# Patient Record
Sex: MALE | Race: WHITE | NOT HISPANIC OR LATINO | Employment: OTHER | URBAN - METROPOLITAN AREA
[De-identification: names, ages, dates, MRNs, and addresses within clinical notes are randomized per-mention and may not be internally consistent; named-entity substitution may affect disease eponyms.]

---

## 2023-08-25 PROBLEM — M54.16 LUMBAR RADICULOPATHY: Status: ACTIVE | Noted: 2023-08-25

## 2023-08-25 PROBLEM — E88.819 INSULIN RESISTANCE: Status: ACTIVE | Noted: 2023-08-25

## 2023-08-25 PROBLEM — Z15.89 JAK-2 GENE MUTATION: Status: ACTIVE | Noted: 2023-08-25

## 2023-08-25 PROBLEM — E55.9 VITAMIN D DEFICIENCY: Status: ACTIVE | Noted: 2023-08-25

## 2023-08-25 PROBLEM — J44.9 COPD MIXED TYPE (MULTI): Status: ACTIVE | Noted: 2023-08-25

## 2023-08-25 PROBLEM — M43.16 SPONDYLOLISTHESIS AT L4-L5 LEVEL: Status: ACTIVE | Noted: 2023-08-25

## 2023-08-25 PROBLEM — Z95.5 HISTORY OF CORONARY ARTERY STENT PLACEMENT: Status: ACTIVE | Noted: 2023-08-25

## 2023-08-25 PROBLEM — Z95.1 S/P CORONARY ARTERY BYPASS GRAFT X 1: Status: ACTIVE | Noted: 2023-08-25

## 2023-08-25 PROBLEM — M48.062 NEUROGENIC CLAUDICATION DUE TO LUMBAR SPINAL STENOSIS: Status: ACTIVE | Noted: 2023-08-25

## 2023-08-25 PROBLEM — I10 BENIGN HYPERTENSION: Status: ACTIVE | Noted: 2023-08-25

## 2023-08-25 PROBLEM — G95.9 CERVICAL MYELOPATHY (MULTI): Status: ACTIVE | Noted: 2023-08-25

## 2023-08-25 PROBLEM — I25.118 CORONARY ARTERY DISEASE OF NATIVE ARTERY OF NATIVE HEART WITH STABLE ANGINA PECTORIS (CMS-HCC): Status: ACTIVE | Noted: 2023-08-25

## 2023-08-25 PROBLEM — M47.14 THORACIC MYELOPATHY: Status: ACTIVE | Noted: 2023-08-25

## 2023-08-25 PROBLEM — E78.5 HYPERLIPIDEMIA: Status: ACTIVE | Noted: 2023-08-25

## 2023-08-25 PROBLEM — D72.10 EOSINOPHILIA, UNSPECIFIED: Status: ACTIVE | Noted: 2023-08-25

## 2023-08-25 PROBLEM — I25.10 ASHD (ARTERIOSCLEROTIC HEART DISEASE): Status: ACTIVE | Noted: 2023-08-25

## 2023-08-25 PROBLEM — Z98.62 STATUS POST ANGIOPLASTY: Status: ACTIVE | Noted: 2023-08-25

## 2023-08-25 RX ORDER — CARVEDILOL 6.25 MG/1
1 TABLET ORAL 2 TIMES DAILY
COMMUNITY
Start: 2021-06-03

## 2023-08-25 RX ORDER — ATORVASTATIN CALCIUM 40 MG/1
TABLET, FILM COATED ORAL
COMMUNITY
Start: 2022-06-28 | End: 2023-10-30

## 2023-08-25 RX ORDER — ACETAMINOPHEN 500 MG
2 TABLET ORAL EVERY 6 HOURS PRN
COMMUNITY
Start: 2022-10-02 | End: 2023-12-18 | Stop reason: ALTCHOICE

## 2023-08-25 RX ORDER — CETIRIZINE HYDROCHLORIDE 10 MG/1
1 TABLET ORAL DAILY
COMMUNITY
End: 2023-12-18 | Stop reason: ALTCHOICE

## 2023-08-25 RX ORDER — PRASUGREL 10 MG/1
1 TABLET, FILM COATED ORAL DAILY
COMMUNITY
Start: 2022-06-28

## 2023-08-25 RX ORDER — ATORVASTATIN CALCIUM 20 MG/1
10 TABLET, FILM COATED ORAL DAILY
COMMUNITY
Start: 2023-04-12 | End: 2023-12-18 | Stop reason: ALTCHOICE

## 2023-08-25 RX ORDER — ERGOCALCIFEROL 1.25 MG/1
CAPSULE ORAL
COMMUNITY
Start: 2022-06-28 | End: 2023-12-18 | Stop reason: ALTCHOICE

## 2023-08-25 RX ORDER — OMEPRAZOLE 20 MG/1
1 TABLET, DELAYED RELEASE ORAL AS NEEDED
COMMUNITY
Start: 2022-10-02 | End: 2023-12-18 | Stop reason: WASHOUT

## 2023-08-25 RX ORDER — NITROGLYCERIN 0.4 MG/1
1 TABLET SUBLINGUAL
COMMUNITY
Start: 2022-06-28

## 2023-08-25 RX ORDER — NAPROXEN SODIUM 220 MG/1
1 TABLET, FILM COATED ORAL DAILY
COMMUNITY
End: 2023-12-18 | Stop reason: ALTCHOICE

## 2023-08-25 RX ORDER — AMLODIPINE BESYLATE 5 MG/1
1 TABLET ORAL DAILY
COMMUNITY
Start: 2022-02-01 | End: 2024-03-21 | Stop reason: SDUPTHER

## 2023-09-30 ENCOUNTER — ANCILLARY PROCEDURE (OUTPATIENT)
Dept: RADIOLOGY | Facility: CLINIC | Age: 68
End: 2023-09-30
Payer: MEDICARE

## 2023-09-30 DIAGNOSIS — R05.9 COUGH: ICD-10-CM

## 2023-09-30 DIAGNOSIS — R06.2 WHEEZING: ICD-10-CM

## 2023-09-30 PROCEDURE — 71046 X-RAY EXAM CHEST 2 VIEWS: CPT | Performed by: RADIOLOGY

## 2023-09-30 PROCEDURE — 71046 X-RAY EXAM CHEST 2 VIEWS: CPT

## 2023-10-25 ENCOUNTER — OFFICE VISIT (OUTPATIENT)
Dept: CARDIOLOGY | Facility: CLINIC | Age: 68
End: 2023-10-25
Payer: MEDICARE

## 2023-10-25 VITALS
SYSTOLIC BLOOD PRESSURE: 128 MMHG | DIASTOLIC BLOOD PRESSURE: 80 MMHG | WEIGHT: 282 LBS | BODY MASS INDEX: 38.25 KG/M2 | HEART RATE: 85 BPM | OXYGEN SATURATION: 95 %

## 2023-10-25 DIAGNOSIS — E78.2 MIXED HYPERLIPIDEMIA: ICD-10-CM

## 2023-10-25 DIAGNOSIS — I10 BENIGN HYPERTENSION: ICD-10-CM

## 2023-10-25 DIAGNOSIS — E78.2 MIXED HYPERLIPIDEMIA: Primary | ICD-10-CM

## 2023-10-25 DIAGNOSIS — I25.10 ASHD (ARTERIOSCLEROTIC HEART DISEASE): Primary | ICD-10-CM

## 2023-10-25 PROCEDURE — 1036F TOBACCO NON-USER: CPT | Performed by: INTERNAL MEDICINE

## 2023-10-25 PROCEDURE — 3079F DIAST BP 80-89 MM HG: CPT | Performed by: INTERNAL MEDICINE

## 2023-10-25 PROCEDURE — 1159F MED LIST DOCD IN RCRD: CPT | Performed by: INTERNAL MEDICINE

## 2023-10-25 PROCEDURE — 3074F SYST BP LT 130 MM HG: CPT | Performed by: INTERNAL MEDICINE

## 2023-10-25 PROCEDURE — 99213 OFFICE O/P EST LOW 20 MIN: CPT | Performed by: INTERNAL MEDICINE

## 2023-10-25 PROCEDURE — 1126F AMNT PAIN NOTED NONE PRSNT: CPT | Performed by: INTERNAL MEDICINE

## 2023-10-25 ASSESSMENT — ENCOUNTER SYMPTOMS
SHORTNESS OF BREATH: 1
NEUROLOGICAL NEGATIVE: 1
GASTROINTESTINAL NEGATIVE: 1
EYES NEGATIVE: 1
CONSTITUTIONAL NEGATIVE: 1
CARDIOVASCULAR NEGATIVE: 1
MUSCULOSKELETAL NEGATIVE: 1
COUGH: 1

## 2023-10-25 ASSESSMENT — PAIN SCALES - GENERAL: PAINLEVEL: 0-NO PAIN

## 2023-10-25 NOTE — PROGRESS NOTES
Subjective   Tuan Wood is a 67 y.o. male.    Chief Complaint:  6 month f/u     HPI  Cardiac wise the patient relatively stable with no anginal type chest pains.  But has had shortness of breath and cough.  Diagnosed with pneumonia and put on a course of antibiotic therapy as well as steroids.  Cough has continued although improved from previous.  Review of Systems   Constitutional: Negative.   Eyes: Negative.    Cardiovascular: Negative.    Respiratory:  Positive for cough and shortness of breath.    Musculoskeletal: Negative.    Gastrointestinal: Negative.    Genitourinary: Negative.    Neurological: Negative.        Objective   Constitutional:       Appearance: Not in distress.   Eyes:      Conjunctiva/sclera: Conjunctivae normal.   Neck:      Vascular: JVD normal.   Pulmonary:      Breath sounds: Normal breath sounds. No wheezing. No rhonchi. No rales.   Cardiovascular:      Normal rate. Regular rhythm.      Murmurs: There is no murmur.      No gallop.  No click. No rub.   Abdominal:      Palpations: Abdomen is soft.   Neurological:      General: No focal deficit present.      Mental Status: Alert.         Lab Review:   No visits with results within 2 Month(s) from this visit.   Latest known visit with results is:   [facility] Encounter on 07/31/2023   Component Date Value    WBC 07/31/2023 8.3     RBC 07/31/2023 6.90 (H)     Hemoglobin 07/31/2023 15.1     Hematocrit 07/31/2023 50.7     MCV 07/31/2023 73 (L)     MCHC 07/31/2023 29.8 (L)     Platelets 07/31/2023 347     RDW 07/31/2023 19.7 (H)     Neutrophils % 07/31/2023 74.1     Immature Granulocytes %,* 07/31/2023 0.2     Lymphocytes % 07/31/2023 15.8     Monocytes % 07/31/2023 5.3     Eosinophils % 07/31/2023 3.9     Basophils % 07/31/2023 0.7     Neutrophils Absolute 07/31/2023 6.15     Lymphocytes Absolute 07/31/2023 1.31     Monocytes Absolute 07/31/2023 0.44     Eosinophils Absolute 07/31/2023 0.32     Basophils Absolute 07/31/2023 0.06     RBC  Morphology 07/31/2023 See Below     Hypochromia 07/31/2023 Mild     Ovalocytes 07/31/2023 Few        Assessment/Plan   The primary encounter diagnosis was ASHD (arteriosclerotic heart disease). Diagnoses of Benign hypertension and Mixed hyperlipidemia were also pertinent to this visit.    Hyperlipidemia  Lipid panel 4/23:  Tchol 158  HDL 30 , now back on the atorvastatin, check panel on  return.    Benign hypertension  BP reasonable, will follow on a routine basis.    ASHD (arteriosclerotic heart disease)  No anginal type chest pains.  Recent pneumonia and treated with antibiotics, slowly better, still coughing.  Staying on prasugrel indefinitely.  EF normal on echo done in March.

## 2023-10-25 NOTE — ASSESSMENT & PLAN NOTE
No anginal type chest pains.  Recent pneumonia and treated with antibiotics, slowly better, still coughing.  Staying on prasugrel indefinitely.  EF normal on echo done in March.

## 2023-10-26 ENCOUNTER — TELEPHONE (OUTPATIENT)
Dept: CARDIOLOGY | Facility: CLINIC | Age: 68
End: 2023-10-26
Payer: MEDICARE

## 2023-10-26 NOTE — TELEPHONE ENCOUNTER
Received fax from [facility] for Lipitor refill 40mg tablets. Not sure if that is correct as last office visit shows patient taking 20mg daily, will send to Dr. Davalos for clarification

## 2023-10-30 ENCOUNTER — OFFICE VISIT (OUTPATIENT)
Dept: HEMATOLOGY/ONCOLOGY | Facility: CLINIC | Age: 68
End: 2023-10-30
Payer: MEDICARE

## 2023-10-30 ENCOUNTER — APPOINTMENT (OUTPATIENT)
Dept: LAB | Facility: HOSPITAL | Age: 68
End: 2023-10-30
Payer: MEDICARE

## 2023-10-30 VITALS
SYSTOLIC BLOOD PRESSURE: 135 MMHG | DIASTOLIC BLOOD PRESSURE: 78 MMHG | TEMPERATURE: 97.9 F | OXYGEN SATURATION: 96 % | WEIGHT: 284.17 LBS | BODY MASS INDEX: 38.49 KG/M2 | HEIGHT: 72 IN | HEART RATE: 70 BPM | RESPIRATION RATE: 18 BRPM

## 2023-10-30 DIAGNOSIS — B27.90 EPSTEIN BARR VIRUS INFECTION: ICD-10-CM

## 2023-10-30 LAB
ALBUMIN SERPL BCP-MCNC: 4.3 G/DL (ref 3.4–5)
ALP SERPL-CCNC: 56 U/L (ref 33–136)
ALT SERPL W P-5'-P-CCNC: 37 U/L (ref 10–52)
ANION GAP SERPL CALC-SCNC: 15 MMOL/L (ref 10–20)
AST SERPL W P-5'-P-CCNC: 23 U/L (ref 9–39)
BASOPHILS # BLD AUTO: 0.1 X10*3/UL (ref 0–0.1)
BASOPHILS NFR BLD AUTO: 1.3 %
BILIRUB SERPL-MCNC: 0.8 MG/DL (ref 0–1.2)
BUN SERPL-MCNC: 7 MG/DL (ref 6–23)
CALCIUM SERPL-MCNC: 10.1 MG/DL (ref 8.6–10.3)
CHLORIDE SERPL-SCNC: 105 MMOL/L (ref 98–107)
CO2 SERPL-SCNC: 22 MMOL/L (ref 21–32)
CREAT SERPL-MCNC: 0.97 MG/DL (ref 0.5–1.3)
EOSINOPHIL # BLD AUTO: 0.41 X10*3/UL (ref 0–0.7)
EOSINOPHIL NFR BLD AUTO: 5.2 %
ERYTHROCYTE [DISTWIDTH] IN BLOOD BY AUTOMATED COUNT: 20.1 % (ref 11.5–14.5)
GFR SERPL CREATININE-BSD FRML MDRD: 87 ML/MIN/1.73M*2
GLUCOSE SERPL-MCNC: 101 MG/DL (ref 74–99)
HCT VFR BLD AUTO: 51.9 % (ref 41–52)
HGB BLD-MCNC: 15.3 G/DL (ref 13.5–17.5)
HYPOCHROMIA BLD QL SMEAR: NORMAL
IMM GRANULOCYTES # BLD AUTO: 0.01 X10*3/UL (ref 0–0.7)
IMM GRANULOCYTES NFR BLD AUTO: 0.1 % (ref 0–0.9)
LDH SERPL L TO P-CCNC: 199 U/L (ref 84–246)
LYMPHOCYTES # BLD AUTO: 1.15 X10*3/UL (ref 1.2–4.8)
LYMPHOCYTES NFR BLD AUTO: 14.6 %
MCH RBC QN AUTO: 21.7 PG (ref 26–34)
MCHC RBC AUTO-ENTMCNC: 29.5 G/DL (ref 32–36)
MCV RBC AUTO: 74 FL (ref 80–100)
MONOCYTES # BLD AUTO: 0.49 X10*3/UL (ref 0.1–1)
MONOCYTES NFR BLD AUTO: 6.2 %
NEUTROPHILS # BLD AUTO: 5.71 X10*3/UL (ref 1.2–7.7)
NEUTROPHILS NFR BLD AUTO: 72.6 %
NRBC BLD-RTO: ABNORMAL /100{WBCS}
OVALOCYTES BLD QL SMEAR: NORMAL
PLATELET # BLD AUTO: 410 X10*3/UL (ref 150–450)
PMV BLD AUTO: 9.2 FL (ref 7.5–11.5)
POTASSIUM SERPL-SCNC: 4.4 MMOL/L (ref 3.5–5.3)
PROT SERPL-MCNC: 7.3 G/DL (ref 6.4–8.2)
RBC # BLD AUTO: 7.05 X10*6/UL (ref 4.5–5.9)
RBC MORPH BLD: NORMAL
SODIUM SERPL-SCNC: 138 MMOL/L (ref 136–145)
STOMATOCYTES BLD QL SMEAR: NORMAL
WBC # BLD AUTO: 7.9 X10*3/UL (ref 4.4–11.3)

## 2023-10-30 PROCEDURE — 36415 COLL VENOUS BLD VENIPUNCTURE: CPT | Performed by: NURSE PRACTITIONER

## 2023-10-30 PROCEDURE — 83615 LACTATE (LD) (LDH) ENZYME: CPT | Performed by: NURSE PRACTITIONER

## 2023-10-30 PROCEDURE — 99214 OFFICE O/P EST MOD 30 MIN: CPT | Mod: PO | Performed by: NURSE PRACTITIONER

## 2023-10-30 PROCEDURE — 3075F SYST BP GE 130 - 139MM HG: CPT | Performed by: NURSE PRACTITIONER

## 2023-10-30 PROCEDURE — 1126F AMNT PAIN NOTED NONE PRSNT: CPT | Performed by: NURSE PRACTITIONER

## 2023-10-30 PROCEDURE — 99214 OFFICE O/P EST MOD 30 MIN: CPT | Performed by: NURSE PRACTITIONER

## 2023-10-30 PROCEDURE — 3078F DIAST BP <80 MM HG: CPT | Performed by: NURSE PRACTITIONER

## 2023-10-30 PROCEDURE — 1036F TOBACCO NON-USER: CPT | Performed by: NURSE PRACTITIONER

## 2023-10-30 PROCEDURE — 85025 COMPLETE CBC W/AUTO DIFF WBC: CPT | Performed by: NURSE PRACTITIONER

## 2023-10-30 PROCEDURE — 1159F MED LIST DOCD IN RCRD: CPT | Performed by: NURSE PRACTITIONER

## 2023-10-30 PROCEDURE — 80053 COMPREHEN METABOLIC PANEL: CPT | Performed by: NURSE PRACTITIONER

## 2023-10-30 RX ORDER — ATORVASTATIN CALCIUM 40 MG/1
40 TABLET, FILM COATED ORAL DAILY
Qty: 90 TABLET | Refills: 0 | Status: SHIPPED | OUTPATIENT
Start: 2023-10-30 | End: 2024-03-25

## 2023-10-30 ASSESSMENT — ENCOUNTER SYMPTOMS
BLOOD IN STOOL: 0
CHILLS: 1
DEPRESSION: 0
ARTHRALGIAS: 1
DIARRHEA: 0
SCLERAL ICTERUS: 0
SHORTNESS OF BREATH: 1
DIAPHORESIS: 1
UNEXPECTED WEIGHT CHANGE: 0
FATIGUE: 1
PALPITATIONS: 0
BACK PAIN: 0
OCCASIONAL FEELINGS OF UNSTEADINESS: 0
COUGH: 1
CONSTIPATION: 0
DIZZINESS: 1
HEADACHES: 1
TROUBLE SWALLOWING: 0
LOSS OF SENSATION IN FEET: 0
SORE THROAT: 0
MYALGIAS: 1
NAUSEA: 0
ABDOMINAL PAIN: 0
FLANK PAIN: 0
FEVER: 1
ADENOPATHY: 0
HEMOPTYSIS: 0

## 2023-10-30 ASSESSMENT — COLUMBIA-SUICIDE SEVERITY RATING SCALE - C-SSRS
6. HAVE YOU EVER DONE ANYTHING, STARTED TO DO ANYTHING, OR PREPARED TO DO ANYTHING TO END YOUR LIFE?: NO
1. IN THE PAST MONTH, HAVE YOU WISHED YOU WERE DEAD OR WISHED YOU COULD GO TO SLEEP AND NOT WAKE UP?: NO
2. HAVE YOU ACTUALLY HAD ANY THOUGHTS OF KILLING YOURSELF?: NO

## 2023-10-30 ASSESSMENT — PATIENT HEALTH QUESTIONNAIRE - PHQ9
SUM OF ALL RESPONSES TO PHQ9 QUESTIONS 1 AND 2: 0
1. LITTLE INTEREST OR PLEASURE IN DOING THINGS: NOT AT ALL
2. FEELING DOWN, DEPRESSED OR HOPELESS: NOT AT ALL

## 2023-10-30 ASSESSMENT — PAIN SCALES - GENERAL: PAINLEVEL: 0-NO PAIN

## 2023-10-30 NOTE — PROGRESS NOTES
"Patient ID: Tuan Wood is a 67 y.o. male.    Primary Care Provider: Pa Riley DO  Visit Type: Follow Up      Subjective    HPI  66 yo with normal blood work prior to back surgery in September 2022 initially presented for evaluation of labs 12/21/22 WBC 12, hgb 15.7 and plt 513, previously 10/1/22 WBC 16.3, hgb 14 and plt 455.  He reviews that in early December he has felt terrible with fevers, night sweats, bone aching, myalgias and muscle spasms and productive cough.   At previous visit he reported symptoms  resolved other than ongoing cough which is occasionally productive and has continued now with mucopurulent sputum and cough that keeps him up at night, but today reports the cough is worse than ever and symptoms are recurring.  He again has dizziness, tinnitis, blurry vision and bitemporal headaches.    He denies abdominal pain or diarrhea.  He has known pulmonary nodules which are unchanged since 2019 by CT done 12/22/22.  One LLL nodule 0.7cm unchanged from 5/19/22.   CT 8/15/23 showed nodular densities right figgural lymph node.   We had previously offered biopsy, but he declined.  He does not have back pain, neuropathy weakness of unilateral lower extremity.  He has a history of multiple MI since 2005 including CABG X 3 and \"a stent ripped\" May 2021.  He is unaware of SOFIA 2 mutation done 12/16/19 or why that might have been tested for, but confirmed SOFIA 2 +    He was feeling better at our visit in May, but is now having worsening cough, now productive occurs when he eats and keeps him up at night.  Cough is mucopurulent. .  The fevers and achiness are resolved, but he still has  deal of fatigue and the problematic cough.  His work up was negative for leukemia and lymphoma panels.  It was only pertinent for recent EBV viral infection which would account for his symptoms.  He is a smoker  His cardiologist recommended pulmonary consult which he has not yet scheduled.      Review of Systems " "  Constitutional:  Positive for chills, diaphoresis, fatigue and fever. Negative for unexpected weight change.   HENT:   Positive for hearing loss and tinnitus. Negative for mouth sores, nosebleeds, sore throat and trouble swallowing.    Eyes:  Negative for icterus.   Respiratory:  Positive for cough and shortness of breath. Negative for hemoptysis.    Cardiovascular:  Negative for palpitations.   Gastrointestinal:  Negative for abdominal pain, blood in stool, constipation, diarrhea and nausea.   Musculoskeletal:  Positive for arthralgias and myalgias. Negative for back pain and flank pain.   Skin:  Negative for itching.   Neurological:  Positive for dizziness and headaches.   Hematological:  Negative for adenopathy.        Objective   BSA: 2.56 meters squared  /78 (BP Location: Left arm)   Pulse 70   Temp 36.6 °C (97.9 °F)   Resp 18   Ht 1.831 m (6' 0.09\")   Wt 129 kg (284 lb 2.8 oz)   SpO2 96%   BMI 38.45 kg/m²      has a past medical history of Old myocardial infarction, Personal history of other diseases of the circulatory system, and Personal history of pulmonary embolism.   has a past surgical history that includes Other surgical history; Other surgical history; and Other surgical history.  Family History   Problem Relation Name Age of Onset    Cancer Mother      Colon cancer Mother      Cancer Father      Lung cancer Father      Lung disease Father      Breast cancer Sister      Cancer Sister      Colon cancer Sister      Cancer Sister      Uterine cancer Sister      Cancer Paternal Grandfather      Heart disease Paternal Grandfather      Other (colorectal cancer) Paternal Grandfather      Cancer Sibling      Stomach cancer Sibling           Tuan Wood  smokes  He  reports that he does not currently use alcohol.  He  reports that he does not currently use drugs.    Physical Exam  Constitutional:       General: He is not in acute distress.     Appearance: He is obese. He is ill-appearing.     "  Comments: He is continuously coughing.  He does not have facial erythema or diaphoretic as first seen almost one year ago.    Eyes:      General: No scleral icterus.     Conjunctiva/sclera: Conjunctivae normal.      Pupils: Pupils are equal, round, and reactive to light.   Cardiovascular:      Rate and Rhythm: Normal rate and regular rhythm.      Pulses: Normal pulses.      Heart sounds: Normal heart sounds. No murmur heard.     No gallop.   Pulmonary:      Effort: Pulmonary effort is normal. No respiratory distress.      Breath sounds: Normal breath sounds. No wheezing or rales.   Abdominal:      Tenderness: There is no abdominal tenderness.   Musculoskeletal:         General: Normal range of motion.   Skin:     General: Skin is warm and dry.      Coloration: Skin is not jaundiced.   Neurological:      Motor: No weakness.   Psychiatric:         Mood and Affect: Mood normal.         WBC   Date/Time Value Ref Range Status   10/30/2023 11:05 AM 7.9 4.4 - 11.3 x10*3/uL Preliminary   07/31/2023 09:52 AM 8.3 4.4 - 11.3 x10E9/L Final   05/01/2023 12:45 PM 9.5 4.4 - 11.3 x10E9/L Final   01/13/2023 01:45 PM 9.3 4.4 - 11.3 x10E9/L Final     nRBC   Date Value Ref Range Status   10/30/2023   Preliminary     Comment:     Not Measured   12/21/2022 0 0 /100 WBC Final   12/15/2022 0 0 /100 WBC Final   06/27/2022 0 0 /100 WBC Final     RBC   Date Value Ref Range Status   10/30/2023 7.05 (H) 4.50 - 5.90 x10*6/uL Preliminary   07/31/2023 6.90 (H) 4.50 - 5.90 x10E12/L Final   05/01/2023 7.38 (H) 4.50 - 5.90 x10E12/L Final   01/13/2023 7.35 (H) 4.50 - 5.90 x10E12/L Final     Hemoglobin   Date Value Ref Range Status   10/30/2023 15.3 13.5 - 17.5 g/dL Preliminary   07/31/2023 15.1 13.5 - 17.5 g/dL Final   05/01/2023 15.7 13.5 - 17.5 g/dL Final   01/13/2023 15.8 13.5 - 17.5 g/dL Final     Hematocrit   Date Value Ref Range Status   10/30/2023 51.9 41.0 - 52.0 % Preliminary   07/31/2023 50.7 41.0 - 52.0 % Final   05/01/2023 54.3 (H) 41.0 -  52.0 % Final   01/13/2023 53.3 (H) 41.0 - 52.0 % Final     MCV   Date/Time Value Ref Range Status   10/30/2023 11:05 AM 74 (L) 80 - 100 fL Preliminary   07/31/2023 09:52 AM 73 (L) 80 - 100 fL Final   05/01/2023 12:45 PM 74 (L) 80 - 100 fL Final   01/13/2023 01:45 PM 73 (L) 80 - 100 fL Final     MCH   Date/Time Value Ref Range Status   10/30/2023 11:05 AM 21.7 (L) 26.0 - 34.0 pg Preliminary   12/21/2022 03:38 PM 21.7 (L) 26 - 34 PG Final   12/15/2022 10:12 AM 21.0 (L) 26 - 34 PG Final     MCHC   Date/Time Value Ref Range Status   10/30/2023 11:05 AM 29.5 (L) 32.0 - 36.0 g/dL Preliminary   07/31/2023 09:52 AM 29.8 (L) 32.0 - 36.0 g/dL Final   05/01/2023 12:45 PM 28.9 (L) 32.0 - 36.0 g/dL Final   01/13/2023 01:45 PM 29.6 (L) 32.0 - 36.0 g/dL Final     RDW   Date/Time Value Ref Range Status   10/30/2023 11:05 AM 20.1 (H) 11.5 - 14.5 % Preliminary   07/31/2023 09:52 AM 19.7 (H) 11.5 - 14.5 % Final   05/01/2023 12:45 PM 20.7 (H) 11.5 - 14.5 % Final   01/13/2023 01:45 PM 21.0 (H) 11.5 - 14.5 % Final     Platelets   Date/Time Value Ref Range Status   10/30/2023 11:05  150 - 450 x10*3/uL Preliminary   07/31/2023 09:52  150 - 450 x10E9/L Final   05/01/2023 12:45  150 - 450 x10E9/L Final   01/13/2023 01:45  (H) 150 - 450 x10E9/L Final     MPV   Date/Time Value Ref Range Status   10/30/2023 11:05 AM 9.2 7.5 - 11.5 fL Preliminary   12/21/2022 03:38 PM 9.6 7.0 - 12.6 CU Final   12/15/2022 10:12 AM 9.8 7.0 - 12.6 CU Final     Neutrophils %   Date/Time Value Ref Range Status   07/31/2023 09:52 AM 74.1 40.0 - 80.0 % Final   05/01/2023 12:45 PM 72.2 40.0 - 80.0 % Final   01/13/2023 01:45 PM 74.2 40.0 - 80.0 % Final     Immature Granulocytes %, Automated   Date/Time Value Ref Range Status   07/31/2023 09:52 AM 0.2 0.0 - 0.9 % Final     Comment:      Immature Granulocyte Count (IG) includes promyelocytes,    myelocytes and metamyelocytes but does not include bands.   Percent differential counts (%) should be  interpreted in the   context of the absolute cell counts (cells/L).     05/01/2023 12:45 PM 0.1 0.0 - 0.9 % Final     Comment:      Immature Granulocyte Count (IG) includes promyelocytes,    myelocytes and metamyelocytes but does not include bands.   Percent differential counts (%) should be interpreted in the   context of the absolute cell counts (cells/L).     01/13/2023 01:45 PM 0.2 0.0 - 0.9 % Final     Comment:      Immature Granulocyte Count (IG) includes promyelocytes,    myelocytes and metamyelocytes but does not include bands.   Percent differential counts (%) should be interpreted in the   context of the absolute cell counts (cells/L).       Lymphocytes %   Date/Time Value Ref Range Status   07/31/2023 09:52 AM 15.8 13.0 - 44.0 % Final     Comment:      Percent differential counts (%) should be interpreted in the   context of the absolute cell counts (cells/L).     05/01/2023 12:45 PM 16.3 13.0 - 44.0 % Final     Comment:      Percent differential counts (%) should be interpreted in the   context of the absolute cell counts (cells/L).     01/13/2023 01:45 PM 14.1 13.0 - 44.0 % Final   12/21/2022 03:38 PM 14.80 (L) 20 - 40 % Final   12/15/2022 10:12 AM 14.30 (L) 20 - 40 % Final   06/27/2022 06:51 PM 15.70 (L) 20 - 40 % Final     Monocytes %   Date/Time Value Ref Range Status   07/31/2023 09:52 AM 5.3 2.0 - 10.0 % Final   05/01/2023 12:45 PM 6.2 2.0 - 10.0 % Final   01/13/2023 01:45 PM 6.2 2.0 - 10.0 % Final   12/21/2022 03:38 PM 6.50 0 - 8 % Final   12/15/2022 10:12 AM 6.80 0 - 8 % Final   06/27/2022 06:51 PM 6.90 0 - 8 % Final     Eosinophils %   Date/Time Value Ref Range Status   07/31/2023 09:52 AM 3.9 0.0 - 6.0 % Final   05/01/2023 12:45 PM 4.4 0.0 - 6.0 % Final   01/13/2023 01:45 PM 4.5 0.0 - 6.0 % Final     Basophils %   Date/Time Value Ref Range Status   07/31/2023 09:52 AM 0.7 0.0 - 2.0 % Final   05/01/2023 12:45 PM 0.8 0.0 - 2.0 % Final   01/13/2023 01:45 PM 0.8 0.0 - 2.0 % Final     Neutrophils  "Absolute   Date/Time Value Ref Range Status   07/31/2023 09:52 AM 6.15 1.20 - 7.70 x10E9/L Final   05/01/2023 12:45 PM 6.86 1.20 - 7.70 x10E9/L Final   01/13/2023 01:45 PM 6.87 1.20 - 7.70 x10E9/L Final     Immature Granulocytes Absolute, Automated   Date/Time Value Ref Range Status   12/21/2022 03:38 PM 0.03 0.0 - 0.1 K/UL Final   12/15/2022 10:12 AM 0.06 0.0 - 0.1 K/UL Final   06/27/2022 06:51 PM 0.05 0.0 - 0.1 K/UL Final     Lymphocytes Absolute   Date/Time Value Ref Range Status   07/31/2023 09:52 AM 1.31 1.20 - 4.80 x10E9/L Final   05/01/2023 12:45 PM 1.55 1.20 - 4.80 x10E9/L Final   01/13/2023 01:45 PM 1.31 1.20 - 4.80 x10E9/L Final     Monocytes Absolute   Date/Time Value Ref Range Status   07/31/2023 09:52 AM 0.44 0.10 - 1.00 x10E9/L Final   05/01/2023 12:45 PM 0.59 0.10 - 1.00 x10E9/L Final   01/13/2023 01:45 PM 0.57 0.10 - 1.00 x10E9/L Final     Eosinophils Absolute   Date/Time Value Ref Range Status   07/31/2023 09:52 AM 0.32 0.00 - 0.70 x10E9/L Final   05/01/2023 12:45 PM 0.42 0.00 - 0.70 x10E9/L Final   01/13/2023 01:45 PM 0.42 0.00 - 0.70 x10E9/L Final     Basophils Absolute   Date/Time Value Ref Range Status   07/31/2023 09:52 AM 0.06 0.00 - 0.10 x10E9/L Final     Comment:     Automated WBC differential has been confirmed by manual smear.   05/01/2023 12:45 PM 0.08 0.00 - 0.10 x10E9/L Final   01/13/2023 01:45 PM 0.07 0.00 - 0.10 x10E9/L Final     Comment:     Automated WBC differential has been confirmed by manual smear.       No components found for: \"PT\"  aPTT   Date/Time Value Ref Range Status   09/13/2022 08:53 AM 38 26 - 39 sec Corrected     Comment:       THE APTT IS NO LONGER USED FOR MONITORING     UNFRACTIONATED HEPARIN THERAPY.    FOR MONITORING HEPARIN THERAPY,     USE THE HEPARIN ASSAY.  Patient's HCT is greater than 55%. Coagulation testing may be affected.     05/20/2021 11:10 PM 44.8 (H) 22.0 - 32.5 SEC Final     Comment:     Performed at [address] [id number] "   05/20/2021 09:04 .7 (H) 22.0 - 32.5 SEC Final     Comment:     RESULT CHECKED   VERIPHY  Performed at [address] [place] [facility], [facility] [id number]         Assessment/Plan    Sena Saunders viral acute illness would account for previously seen abnormalities of CBC and his symptoms of fevers, night sweats, myalgias and arthralgias.  He no longer has  dizziness.  The headaches, blurry visiion ad tinnitis are resolved. He has continued fatigue which would not be abnormal for EBV viral infection.   The concerning thing is that symptoms are recurring, not resolving.  His CBC is normal however.     Cough continues and is now productive and keeping him up at night.  He has had cough for several months now and review of CT 12/22/22 showed numerous fissural nodules which were unchanged, but new 0.7cm LLL nodule.  8/15/23 repeat CT scan  showed nodular densities and right fissural lymph node.  We had previously offered PET scan and/or biopsy which he declined.   I agree with cardiology that he needs to see pulmonaologist and encouraged him to do so.   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  Can be monitored with routing CBC.    Cardiac disease with multiple MI, CABG X3 will follow with cardiology    Plan:    OV 6 months with CBC       Diagnoses and all orders for this visit:  Sena Barr virus infection  -     Comprehensive Metabolic Panel; Future  -     CBC and Auto Differential; Future  -     Lactate Dehydrogenase; Future  -     CBC and Auto Differential; Future  -     CBC and Auto Differential; Future  -     Clinic Appointment Request Follow up; Future           Diya Valdez PA-C

## 2023-12-17 PROBLEM — M17.0 LOCALIZED OSTEOARTHRITIS OF KNEES, BILATERAL: Status: ACTIVE | Noted: 2023-12-17

## 2023-12-18 ENCOUNTER — LAB (OUTPATIENT)
Dept: LAB | Facility: LAB | Age: 68
End: 2023-12-18
Payer: MEDICARE

## 2023-12-18 ENCOUNTER — OFFICE VISIT (OUTPATIENT)
Dept: PRIMARY CARE | Facility: CLINIC | Age: 68
End: 2023-12-18
Payer: MEDICARE

## 2023-12-18 VITALS
OXYGEN SATURATION: 95 % | HEART RATE: 91 BPM | SYSTOLIC BLOOD PRESSURE: 152 MMHG | BODY MASS INDEX: 38.78 KG/M2 | WEIGHT: 286.6 LBS | TEMPERATURE: 99 F | DIASTOLIC BLOOD PRESSURE: 88 MMHG

## 2023-12-18 DIAGNOSIS — Z00.00 ROUTINE GENERAL MEDICAL EXAMINATION AT HEALTH CARE FACILITY: ICD-10-CM

## 2023-12-18 DIAGNOSIS — H10.13 ALLERGIC CONJUNCTIVITIS AND RHINITIS, BILATERAL: ICD-10-CM

## 2023-12-18 DIAGNOSIS — I25.118 CORONARY ARTERY DISEASE OF NATIVE ARTERY OF NATIVE HEART WITH STABLE ANGINA PECTORIS (CMS-HCC): ICD-10-CM

## 2023-12-18 DIAGNOSIS — E78.2 MIXED HYPERLIPIDEMIA: ICD-10-CM

## 2023-12-18 DIAGNOSIS — K21.9 GERD WITHOUT ESOPHAGITIS: ICD-10-CM

## 2023-12-18 DIAGNOSIS — Z12.5 SCREENING FOR PROSTATE CANCER: ICD-10-CM

## 2023-12-18 DIAGNOSIS — R71.8 MICROCYTOSIS: ICD-10-CM

## 2023-12-18 DIAGNOSIS — E66.01 OBESITY, MORBID (MULTI): ICD-10-CM

## 2023-12-18 DIAGNOSIS — R73.09 ELEVATED GLUCOSE: ICD-10-CM

## 2023-12-18 DIAGNOSIS — I10 BENIGN HYPERTENSION: ICD-10-CM

## 2023-12-18 DIAGNOSIS — J30.9 ALLERGIC CONJUNCTIVITIS AND RHINITIS, BILATERAL: ICD-10-CM

## 2023-12-18 DIAGNOSIS — R68.82 LOW LIBIDO: ICD-10-CM

## 2023-12-18 DIAGNOSIS — Z00.00 ROUTINE GENERAL MEDICAL EXAMINATION AT HEALTH CARE FACILITY: Primary | ICD-10-CM

## 2023-12-18 PROBLEM — G95.9 CERVICAL MYELOPATHY (MULTI): Status: RESOLVED | Noted: 2023-08-25 | Resolved: 2023-12-18

## 2023-12-18 PROBLEM — J44.9 COPD MIXED TYPE (MULTI): Status: RESOLVED | Noted: 2023-08-25 | Resolved: 2023-12-18

## 2023-12-18 LAB
ALBUMIN SERPL BCP-MCNC: 4.5 G/DL (ref 3.4–5)
ALP SERPL-CCNC: 70 U/L (ref 33–136)
ALT SERPL W P-5'-P-CCNC: 39 U/L (ref 10–52)
ANION GAP SERPL CALC-SCNC: 17 MMOL/L (ref 10–20)
AST SERPL W P-5'-P-CCNC: 33 U/L (ref 9–39)
BASOPHILS # BLD AUTO: 0.18 X10*3/UL (ref 0–0.1)
BASOPHILS NFR BLD AUTO: 2 %
BILIRUB SERPL-MCNC: 0.9 MG/DL (ref 0–1.2)
BUN SERPL-MCNC: 9 MG/DL (ref 6–23)
CALCIUM SERPL-MCNC: 9.8 MG/DL (ref 8.6–10.3)
CHLORIDE SERPL-SCNC: 103 MMOL/L (ref 98–107)
CHOLEST SERPL-MCNC: 102 MG/DL (ref 0–199)
CHOLESTEROL/HDL RATIO: 2.8
CO2 SERPL-SCNC: 25 MMOL/L (ref 21–32)
CREAT SERPL-MCNC: 1.05 MG/DL (ref 0.5–1.3)
EOSINOPHIL # BLD AUTO: 0.47 X10*3/UL (ref 0–0.7)
EOSINOPHIL NFR BLD AUTO: 5.1 %
ERYTHROCYTE [DISTWIDTH] IN BLOOD BY AUTOMATED COUNT: 20.2 % (ref 11.5–14.5)
EST. AVERAGE GLUCOSE BLD GHB EST-MCNC: 111 MG/DL
FERRITIN SERPL-MCNC: 17 NG/ML (ref 20–300)
GFR SERPL CREATININE-BSD FRML MDRD: 78 ML/MIN/1.73M*2
GLUCOSE SERPL-MCNC: 83 MG/DL (ref 74–99)
HBA1C MFR BLD: 5.5 %
HCT VFR BLD AUTO: 55.3 % (ref 41–52)
HDLC SERPL-MCNC: 36.9 MG/DL
HGB BLD-MCNC: 15.6 G/DL (ref 13.5–17.5)
IMM GRANULOCYTES # BLD AUTO: 0.04 X10*3/UL (ref 0–0.7)
IMM GRANULOCYTES NFR BLD AUTO: 0.4 % (ref 0–0.9)
IRON SATN MFR SERPL: 6 % (ref 25–45)
IRON SERPL-MCNC: 27 UG/DL (ref 35–150)
LDLC SERPL CALC-MCNC: 48 MG/DL
LYMPHOCYTES # BLD AUTO: 1.23 X10*3/UL (ref 1.2–4.8)
LYMPHOCYTES NFR BLD AUTO: 13.4 %
MCH RBC QN AUTO: 20.6 PG (ref 26–34)
MCHC RBC AUTO-ENTMCNC: 28.2 G/DL (ref 32–36)
MCV RBC AUTO: 73 FL (ref 80–100)
MONOCYTES # BLD AUTO: 0.65 X10*3/UL (ref 0.1–1)
MONOCYTES NFR BLD AUTO: 7.1 %
NEUTROPHILS # BLD AUTO: 6.64 X10*3/UL (ref 1.2–7.7)
NEUTROPHILS NFR BLD AUTO: 72 %
NON HDL CHOLESTEROL: 65 MG/DL (ref 0–149)
NRBC BLD-RTO: 0 /100 WBCS (ref 0–0)
PLATELET # BLD AUTO: 456 X10*3/UL (ref 150–450)
POTASSIUM SERPL-SCNC: 4.6 MMOL/L (ref 3.5–5.3)
PROT SERPL-MCNC: 7.5 G/DL (ref 6.4–8.2)
RBC # BLD AUTO: 7.56 X10*6/UL (ref 4.5–5.9)
SODIUM SERPL-SCNC: 140 MMOL/L (ref 136–145)
TIBC SERPL-MCNC: 425 UG/DL (ref 240–445)
TRIGL SERPL-MCNC: 84 MG/DL (ref 0–149)
TSH SERPL-ACNC: 3.16 MIU/L (ref 0.44–3.98)
UIBC SERPL-MCNC: 398 UG/DL (ref 110–370)
VLDL: 17 MG/DL (ref 0–40)
WBC # BLD AUTO: 9.2 X10*3/UL (ref 4.4–11.3)

## 2023-12-18 PROCEDURE — 36415 COLL VENOUS BLD VENIPUNCTURE: CPT

## 2023-12-18 PROCEDURE — 83036 HEMOGLOBIN GLYCOSYLATED A1C: CPT

## 2023-12-18 PROCEDURE — 84443 ASSAY THYROID STIM HORMONE: CPT

## 2023-12-18 PROCEDURE — 1126F AMNT PAIN NOTED NONE PRSNT: CPT | Performed by: FAMILY MEDICINE

## 2023-12-18 PROCEDURE — 84402 ASSAY OF FREE TESTOSTERONE: CPT

## 2023-12-18 PROCEDURE — 83550 IRON BINDING TEST: CPT

## 2023-12-18 PROCEDURE — 99214 OFFICE O/P EST MOD 30 MIN: CPT | Performed by: FAMILY MEDICINE

## 2023-12-18 PROCEDURE — 1160F RVW MEDS BY RX/DR IN RCRD: CPT | Performed by: FAMILY MEDICINE

## 2023-12-18 PROCEDURE — 1159F MED LIST DOCD IN RCRD: CPT | Performed by: FAMILY MEDICINE

## 2023-12-18 PROCEDURE — 83540 ASSAY OF IRON: CPT

## 2023-12-18 PROCEDURE — 99215 OFFICE O/P EST HI 40 MIN: CPT | Performed by: FAMILY MEDICINE

## 2023-12-18 PROCEDURE — 1170F FXNL STATUS ASSESSED: CPT | Performed by: FAMILY MEDICINE

## 2023-12-18 PROCEDURE — 80061 LIPID PANEL: CPT

## 2023-12-18 PROCEDURE — 3077F SYST BP >= 140 MM HG: CPT | Performed by: FAMILY MEDICINE

## 2023-12-18 PROCEDURE — G0103 PSA SCREENING: HCPCS

## 2023-12-18 PROCEDURE — 82728 ASSAY OF FERRITIN: CPT

## 2023-12-18 PROCEDURE — G0439 PPPS, SUBSEQ VISIT: HCPCS | Performed by: FAMILY MEDICINE

## 2023-12-18 PROCEDURE — 85025 COMPLETE CBC W/AUTO DIFF WBC: CPT

## 2023-12-18 PROCEDURE — 3079F DIAST BP 80-89 MM HG: CPT | Performed by: FAMILY MEDICINE

## 2023-12-18 PROCEDURE — 1036F TOBACCO NON-USER: CPT | Performed by: FAMILY MEDICINE

## 2023-12-18 PROCEDURE — 80053 COMPREHEN METABOLIC PANEL: CPT

## 2023-12-18 RX ORDER — OMEPRAZOLE 20 MG/1
20 CAPSULE, DELAYED RELEASE ORAL DAILY
Qty: 90 CAPSULE | Refills: 3 | Status: SHIPPED | OUTPATIENT
Start: 2023-12-18 | End: 2024-12-17

## 2023-12-18 RX ORDER — OLOPATADINE HYDROCHLORIDE 2 MG/ML
1 SOLUTION/ DROPS OPHTHALMIC DAILY
Qty: 5 ML | Refills: 2 | Status: SHIPPED | OUTPATIENT
Start: 2023-12-18

## 2023-12-18 ASSESSMENT — ACTIVITIES OF DAILY LIVING (ADL)
BATHING: INDEPENDENT
MANAGING_FINANCES: INDEPENDENT
GROCERY_SHOPPING: INDEPENDENT
DRESSING: INDEPENDENT
DOING_HOUSEWORK: INDEPENDENT
TAKING_MEDICATION: INDEPENDENT

## 2023-12-18 ASSESSMENT — ENCOUNTER SYMPTOMS
OCCASIONAL FEELINGS OF UNSTEADINESS: 0
LOSS OF SENSATION IN FEET: 0
DEPRESSION: 0

## 2023-12-18 ASSESSMENT — PATIENT HEALTH QUESTIONNAIRE - PHQ9
2. FEELING DOWN, DEPRESSED OR HOPELESS: NOT AT ALL
1. LITTLE INTEREST OR PLEASURE IN DOING THINGS: NOT AT ALL
SUM OF ALL RESPONSES TO PHQ9 QUESTIONS 1 AND 2: 0

## 2023-12-18 ASSESSMENT — PAIN SCALES - GENERAL: PAINLEVEL: 0-NO PAIN

## 2023-12-18 NOTE — PROGRESS NOTES
Subjective   Reason for Visit: Tuan Wood is an 68 y.o. male here for a Medicare Wellness visit.     Past Medical, Surgical, and Family History reviewed and updated in chart.    Reviewed all medications by prescribing practitioner or clinical pharmacist (such as prescriptions, OTCs, herbal therapies and supplements) and documented in the medical record.    Hypertension  -Patient is here for follow-up of elevated blood pressure.   -Blood pressure stable.    -Cardiac symptoms: none.   -Patient denies chest pain, dyspnea, and irregular heart beat.  Pt is fatigued  -Cardiologist: Dr. Davalos            Patient Care Team:  Pa Riley DO as PCP - General (Family Medicine)  ELDER Velasco as PCP - [facility] Medicaid PCP  MD Chidi Price DO as Consulting Physician (Cardiology)  Diya Valdez PA-C as Physician Assistant (Hematology and Oncology)     Review of Systems    Objective   Vitals:  /88 (BP Location: Right arm, Patient Position: Sitting)   Pulse 91   Temp 37.2 °C (99 °F) (Temporal)   Wt 130 kg (286 lb 9.6 oz)   SpO2 95%   BMI 38.78 kg/m²       Physical Exam  Vitals reviewed.   Constitutional:       General: He is not in acute distress.  Eyes:      General:         Right eye: Discharge present.         Left eye: Discharge present.  Cardiovascular:      Rate and Rhythm: Normal rate and regular rhythm.   Pulmonary:      Effort: Pulmonary effort is normal.      Breath sounds: No wheezing or rhonchi.   Musculoskeletal:      Right lower leg: No edema.      Left lower leg: No edema.   Lymphadenopathy:      Cervical: No cervical adenopathy.   Neurological:      Mental Status: He is alert.     Colon Cancer Screening Refusal.    -Patient refuses colon cancer screening including FIT testing, Cologuard and colonoscopy. Is aware of the implications of refusal including missed early detection of colorectal cancer which could lead to increased morbidity and mortality.      Assessment/Plan   Problem List Items Addressed This Visit       Coronary artery disease of native artery of native heart with stable angina pectoris (CMS/HCC)    Benign hypertension    Hyperlipidemia    Obesity, morbid (CMS/HCC)     Other Visit Diagnoses       Routine general medical examination at health care facility    -  Primary    Relevant Orders    1 Year Follow Up In Primary Care - Wellness Exam    Microcytosis        Elevated glucose        Allergic conjunctivitis and rhinitis, bilateral        Low libido        Screening for prostate cancer

## 2023-12-18 NOTE — PATIENT INSTRUCTIONS
For physical - order for routine blood work.     For bilateral eye burning - likely allergic conjunctivitis - recommend pataday 1 drop each eye once a day.  If not helping in 3-4 weeks, please call for ophthalmology.     For fatigue - ?cause at this time.  Blood work shows microcytosis.  Check iron studies.  If low check stool studies.  Also recommend checking glucose, testosterone.      For BP - continue current regimen    For cholesterol/CAD - continue atorvastatin.  Recheck cholesterol     Follow up in 6 months.

## 2023-12-19 DIAGNOSIS — R71.8 MICROCYTOSIS: Primary | ICD-10-CM

## 2023-12-19 LAB — PSA SERPL-MCNC: 0.79 NG/ML

## 2023-12-19 RX ORDER — FERROUS SULFATE 325(65) MG
1 TABLET, DELAYED RELEASE (ENTERIC COATED) ORAL
Qty: 30 TABLET | Refills: 2 | Status: SHIPPED | OUTPATIENT
Start: 2023-12-19 | End: 2024-04-10 | Stop reason: SINTOL

## 2023-12-25 LAB
TESTOSTERONE FREE (CHAN): 59.7 PG/ML (ref 35–155)
TESTOSTERONE,TOTAL,LC-MS/MS: 449 NG/DL (ref 250–1100)

## 2024-02-07 ENCOUNTER — HOSPITAL ENCOUNTER (OUTPATIENT)
Dept: RADIOLOGY | Facility: CLINIC | Age: 69
Discharge: HOME | End: 2024-02-07
Payer: MEDICARE

## 2024-02-07 DIAGNOSIS — R05.1 ACUTE COUGH: ICD-10-CM

## 2024-02-07 PROCEDURE — 71046 X-RAY EXAM CHEST 2 VIEWS: CPT | Mod: FOREIGN READ | Performed by: RADIOLOGY

## 2024-02-07 PROCEDURE — 71046 X-RAY EXAM CHEST 2 VIEWS: CPT

## 2024-03-21 DIAGNOSIS — E78.2 MIXED HYPERLIPIDEMIA: ICD-10-CM

## 2024-03-21 DIAGNOSIS — I10 BENIGN HYPERTENSION: Primary | ICD-10-CM

## 2024-03-22 RX ORDER — AMLODIPINE BESYLATE 5 MG/1
5 TABLET ORAL DAILY
Qty: 90 TABLET | Refills: 3 | Status: SHIPPED | OUTPATIENT
Start: 2024-03-22

## 2024-03-25 RX ORDER — ATORVASTATIN CALCIUM 40 MG/1
40 TABLET, FILM COATED ORAL DAILY
Qty: 90 TABLET | Refills: 0 | Status: SHIPPED | OUTPATIENT
Start: 2024-03-25

## 2024-04-06 PROBLEM — I25.118 CORONARY ARTERY DISEASE OF NATIVE ARTERY OF NATIVE HEART WITH STABLE ANGINA PECTORIS (CMS-HCC): Status: RESOLVED | Noted: 2023-08-25 | Resolved: 2024-04-06

## 2024-04-06 NOTE — ASSESSMENT & PLAN NOTE
Dr. Riley checked lipid panel in Dec: Tchol 102 TG 84 HDL 36 LDL 48.  Reasonable levels.  Will continue the atorvastatin 40 mg daily And review labs in the future.

## 2024-04-10 ENCOUNTER — OFFICE VISIT (OUTPATIENT)
Dept: CARDIOLOGY | Facility: CLINIC | Age: 69
End: 2024-04-10
Payer: MEDICARE

## 2024-04-10 VITALS
DIASTOLIC BLOOD PRESSURE: 80 MMHG | SYSTOLIC BLOOD PRESSURE: 126 MMHG | BODY MASS INDEX: 37.88 KG/M2 | WEIGHT: 280 LBS | HEART RATE: 70 BPM

## 2024-04-10 DIAGNOSIS — I25.10 ASHD (ARTERIOSCLEROTIC HEART DISEASE): Primary | ICD-10-CM

## 2024-04-10 DIAGNOSIS — R55 NEAR SYNCOPE: ICD-10-CM

## 2024-04-10 DIAGNOSIS — I10 BENIGN HYPERTENSION: ICD-10-CM

## 2024-04-10 DIAGNOSIS — E78.2 MIXED HYPERLIPIDEMIA: ICD-10-CM

## 2024-04-10 PROCEDURE — 3079F DIAST BP 80-89 MM HG: CPT | Performed by: INTERNAL MEDICINE

## 2024-04-10 PROCEDURE — 1126F AMNT PAIN NOTED NONE PRSNT: CPT | Performed by: INTERNAL MEDICINE

## 2024-04-10 PROCEDURE — 3074F SYST BP LT 130 MM HG: CPT | Performed by: INTERNAL MEDICINE

## 2024-04-10 PROCEDURE — 1159F MED LIST DOCD IN RCRD: CPT | Performed by: INTERNAL MEDICINE

## 2024-04-10 PROCEDURE — 1157F ADVNC CARE PLAN IN RCRD: CPT | Performed by: INTERNAL MEDICINE

## 2024-04-10 PROCEDURE — 99214 OFFICE O/P EST MOD 30 MIN: CPT | Performed by: INTERNAL MEDICINE

## 2024-04-10 PROCEDURE — 1036F TOBACCO NON-USER: CPT | Performed by: INTERNAL MEDICINE

## 2024-04-10 ASSESSMENT — ENCOUNTER SYMPTOMS
COUGH: 0
PALPITATIONS: 0
BLURRED VISION: 0
DYSURIA: 0
ABDOMINAL PAIN: 0
PARESTHESIAS: 0
SHORTNESS OF BREATH: 0
NUMBNESS: 0
DYSPNEA ON EXERTION: 0
HEMATURIA: 0

## 2024-04-10 ASSESSMENT — PAIN SCALES - GENERAL: PAINLEVEL: 0-NO PAIN

## 2024-04-10 NOTE — ASSESSMENT & PLAN NOTE
Blood pressure reasonably controlled on the amlodipine and carvedilol therapy.  Will continue same regimen and follow on a regular basis.

## 2024-04-10 NOTE — ASSESSMENT & PLAN NOTE
No classic anginal type symptoms.  Left arm pain is almost certainly musculoskeletal and not cardiac.  Will continue standard risk factor modification and follow on a clinical basis.

## 2024-04-10 NOTE — PROGRESS NOTES
Subjective   Tuan Wood is a 68 y.o. male.    Chief Complaint:  ASHD (6 month f/u)    HPI  Patient doing well overall.  Did have a sharp pain in his left arm that only lasted for a second or 2.  Also had a couple episodes in 1 day where he became lightheaded sweaty and felt like he was almost going to pass out but did not.  This occurred over 24 hours.  It has not reoccurred since then.  Does not describe anginal type chest discomfort.    Review of Systems   Constitutional: Negative for malaise/fatigue.   HENT:  Negative for congestion.    Eyes:  Negative for blurred vision.   Cardiovascular:  Negative for chest pain, dyspnea on exertion and palpitations.   Respiratory:  Negative for cough and shortness of breath.    Musculoskeletal:  Negative for joint pain.   Gastrointestinal:  Negative for abdominal pain.   Genitourinary:  Negative for dysuria and hematuria.   Neurological:  Negative for numbness and paresthesias.       Objective   Constitutional:       Appearance: Not in distress.   Eyes:      Conjunctiva/sclera: Conjunctivae normal.   Neck:      Vascular: JVD normal.   Pulmonary:      Breath sounds: Normal breath sounds. No wheezing. No rhonchi. No rales.   Cardiovascular:      Normal rate. Regular rhythm.      Murmurs: There is no murmur.      No gallop.  No click. No rub.   Abdominal:      Palpations: Abdomen is soft.   Neurological:      General: No focal deficit present.      Mental Status: Alert.         Lab Review:       Assessment/Plan   The primary encounter diagnosis was ASHD (arteriosclerotic heart disease). Diagnoses of Benign hypertension and Mixed hyperlipidemia were also pertinent to this visit.    Hyperlipidemia  Dr. Riley checked lipid panel in Dec: Tchol 102 TG 84 HDL 36 LDL 48.  Reasonable levels.  Will continue the atorvastatin 40 mg daily And review labs in the future.    ASHD (arteriosclerotic heart disease)  No classic anginal type symptoms.  Left arm pain is almost certainly  musculoskeletal and not cardiac.  Will continue standard risk factor modification and follow on a clinical basis.    Benign hypertension  Blood pressure reasonably controlled on the amlodipine and carvedilol therapy.  Will continue same regimen and follow on a regular basis.    Near syncope  Symptoms of near syncope certainly have characteristics suggestive of a vasovagal type episode.  Echocardiogram done a year ago revealed a structurally normal heart.  Will follow clinically at this time.  If he has recurrent symptoms we will need to consider other testing such as a patch monitor.

## 2024-04-10 NOTE — ASSESSMENT & PLAN NOTE
Symptoms of near syncope certainly have characteristics suggestive of a vasovagal type episode.  Echocardiogram done a year ago revealed a structurally normal heart.  Will follow clinically at this time.  If he has recurrent symptoms we will need to consider other testing such as a patch monitor.

## 2024-04-29 ENCOUNTER — OFFICE VISIT (OUTPATIENT)
Dept: HEMATOLOGY/ONCOLOGY | Facility: CLINIC | Age: 69
End: 2024-04-29
Payer: MEDICARE

## 2024-04-29 VITALS
BODY MASS INDEX: 37.86 KG/M2 | HEART RATE: 69 BPM | WEIGHT: 279.54 LBS | HEIGHT: 72 IN | SYSTOLIC BLOOD PRESSURE: 137 MMHG | OXYGEN SATURATION: 94 % | RESPIRATION RATE: 16 BRPM | DIASTOLIC BLOOD PRESSURE: 86 MMHG | TEMPERATURE: 96.8 F

## 2024-04-29 DIAGNOSIS — B27.90 EPSTEIN BARR VIRUS INFECTION: ICD-10-CM

## 2024-04-29 DIAGNOSIS — R71.8 MICROCYTOSIS: ICD-10-CM

## 2024-04-29 LAB
BASOPHILS # BLD AUTO: 0.08 X10*3/UL (ref 0–0.1)
BASOPHILS NFR BLD AUTO: 0.9 %
EOSINOPHIL # BLD AUTO: 0.44 X10*3/UL (ref 0–0.7)
EOSINOPHIL NFR BLD AUTO: 4.7 %
ERYTHROCYTE [DISTWIDTH] IN BLOOD BY AUTOMATED COUNT: 21.1 % (ref 11.5–14.5)
FERRITIN SERPL-MCNC: 18 NG/ML (ref 20–300)
HCT VFR BLD AUTO: 53.2 % (ref 41–52)
HGB BLD-MCNC: 15.4 G/DL (ref 13.5–17.5)
IMM GRANULOCYTES # BLD AUTO: 0.02 X10*3/UL (ref 0–0.7)
IMM GRANULOCYTES NFR BLD AUTO: 0.2 % (ref 0–0.9)
IRON SATN MFR SERPL: 8 % (ref 25–45)
IRON SERPL-MCNC: 30 UG/DL (ref 35–150)
LYMPHOCYTES # BLD AUTO: 1.3 X10*3/UL (ref 1.2–4.8)
LYMPHOCYTES NFR BLD AUTO: 14 %
MCH RBC QN AUTO: 20.2 PG (ref 26–34)
MCHC RBC AUTO-ENTMCNC: 28.9 G/DL (ref 32–36)
MCV RBC AUTO: 70 FL (ref 80–100)
MONOCYTES # BLD AUTO: 0.46 X10*3/UL (ref 0.1–1)
MONOCYTES NFR BLD AUTO: 5 %
NEUTROPHILS # BLD AUTO: 6.97 X10*3/UL (ref 1.2–7.7)
NEUTROPHILS NFR BLD AUTO: 75.2 %
NRBC BLD-RTO: ABNORMAL /100{WBCS}
PLATELET # BLD AUTO: 405 X10*3/UL (ref 150–450)
RBC # BLD AUTO: 7.61 X10*6/UL (ref 4.5–5.9)
TIBC SERPL-MCNC: 391 UG/DL (ref 240–445)
UIBC SERPL-MCNC: 361 UG/DL (ref 110–370)
WBC # BLD AUTO: 9.3 X10*3/UL (ref 4.4–11.3)

## 2024-04-29 PROCEDURE — 1159F MED LIST DOCD IN RCRD: CPT | Performed by: NURSE PRACTITIONER

## 2024-04-29 PROCEDURE — 99215 OFFICE O/P EST HI 40 MIN: CPT | Performed by: NURSE PRACTITIONER

## 2024-04-29 PROCEDURE — 36415 COLL VENOUS BLD VENIPUNCTURE: CPT | Performed by: NURSE PRACTITIONER

## 2024-04-29 PROCEDURE — 1157F ADVNC CARE PLAN IN RCRD: CPT | Performed by: NURSE PRACTITIONER

## 2024-04-29 PROCEDURE — 3075F SYST BP GE 130 - 139MM HG: CPT | Performed by: NURSE PRACTITIONER

## 2024-04-29 PROCEDURE — 1126F AMNT PAIN NOTED NONE PRSNT: CPT | Performed by: NURSE PRACTITIONER

## 2024-04-29 PROCEDURE — 3079F DIAST BP 80-89 MM HG: CPT | Performed by: NURSE PRACTITIONER

## 2024-04-29 ASSESSMENT — ENCOUNTER SYMPTOMS
GASTROINTESTINAL NEGATIVE: 1
FATIGUE: 0
CARDIOVASCULAR NEGATIVE: 1
COUGH: 1
FEVER: 0
MUSCULOSKELETAL NEGATIVE: 1
SHORTNESS OF BREATH: 0
DIAPHORESIS: 0
NEUROLOGICAL NEGATIVE: 1
EYES NEGATIVE: 1
CHEST TIGHTNESS: 0
HEMATOLOGIC/LYMPHATIC NEGATIVE: 1

## 2024-04-29 ASSESSMENT — PAIN SCALES - GENERAL: PAINLEVEL: 0-NO PAIN

## 2024-04-29 NOTE — PROGRESS NOTES
"Patient ID: Tuan Wood is a 68 y.o. male.  Referring Physician: Diya Valdez, ARACELY  [address]  [place] [address]  Primary Care Provider: Pa Riley DO  Visit Type: Follow Up      Subjective    HPI  68 yo with normal blood work prior to back surgery in September 2022 initially presented for evaluation of labs 12/21/22 WBC 12, hgb 15.7 and plt 513, previously 10/1/22 WBC 16.3, hgb 14 and plt 455.  He reviews that in early December 2022 he has felt terrible with fevers, night sweats, bone aching, myalgias and muscle spasms and productive cough.   At previous visit he reported symptoms  resolved other than ongoing cough which is occasionally productive and has continued now with mucopurulent sputum and cough that keeps him up at night, but today reports the cough is worse than ever and symptoms are recurring.  This cough is now improved.   He no longer  has dizziness, tinnitis, blurry vision and bitemporal headaches.    He denies abdominal pain or diarrhea.  He has known pulmonary nodules which are unchanged since 2019 by CT done 12/22/22.  One LLL nodule 0.7cm unchanged from 5/19/22.   CT 8/15/23 showed nodular densities right figgural lymph node.   We had previously offered biopsy, but he declined.  He does not have back pain, neuropathy weakness of unilateral lower extremity.  He has a history of multiple MI since 2005 including CABG X 3 and \"a stent ripped\" May 2021.  He is unaware of SOFIA 2 mutation done 12/16/19 or why that might have been tested for, but confirmed SOFIA 2 +      The fevers and achiness are resolved, but he no longer  has fatigue and the problematic cough.  His work up was negative for leukemia and lymphoma panels.  It was only pertinent for recent EBV viral infection which would account for his symptoms.  He was found to have iron deficiency but doesn't tolerate oral iron.  His platelets have normalized likely secondary to resolution of inflammatory process with prior " "symptoms and problems.  He states he quit smoking 30 years ago.  He feels tremendously better than when seen previously      Review of Systems   Constitutional:  Negative for diaphoresis, fatigue and fever.   HENT:  Negative.     Eyes: Negative.    Respiratory:  Positive for cough. Negative for chest tightness and shortness of breath.         Minor at this time and not as problematic   Cardiovascular: Negative.    Gastrointestinal: Negative.    Genitourinary: Negative.     Musculoskeletal: Negative.    Skin: Negative.    Neurological: Negative.    Hematological: Negative.       Objective   BSA: 2.54 meters squared  /86 (BP Location: Left arm)   Pulse 69   Temp 36 °C (96.8 °F)   Resp 16   Ht (S) 1.831 m (6' 0.09\")   Wt 127 kg (279 lb 8.7 oz)   SpO2 94%   BMI 37.82 kg/m²      has a past medical history of Old myocardial infarction, Personal history of other diseases of the circulatory system, and Personal history of pulmonary embolism.   has a past surgical history that includes Other surgical history; Other surgical history; Other surgical history; Coronary artery bypass graft; Cardiac catheterization; Coronary stent placement; and Spinal fusion.  Family History   Problem Relation Name Age of Onset    Cancer Mother      Colon cancer Mother      Cancer Father      Lung cancer Father      Lung disease Father      Breast cancer Sister      Cancer Sister      Colon cancer Sister      Cancer Sister      Uterine cancer Sister      Cancer Paternal Grandfather      Heart disease Paternal Grandfather      Other (colorectal cancer) Paternal Grandfather      Cancer Sibling      Stomach cancer Sibling           Tuan Wodo  reports that he quit smoking about 25 years ago. His smoking use included cigarettes. He has never used smokeless tobacco.  He  reports current alcohol use.  He  reports that he does not currently use drugs.    Physical Exam  Constitutional:       Appearance: He is obese. He is not " ill-appearing.      Comments: He appears to be feeling significantly better   He did appear acute ill with facial erythema, sweating, shortness of breath when seen intially.    Eyes:      Conjunctiva/sclera: Conjunctivae normal.      Pupils: Pupils are equal, round, and reactive to light.   Cardiovascular:      Rate and Rhythm: Normal rate and regular rhythm.      Pulses: Normal pulses.      Heart sounds: Normal heart sounds. No murmur heard.  Pulmonary:      Effort: Pulmonary effort is normal. No respiratory distress.      Breath sounds: Normal breath sounds. No stridor. No wheezing or rhonchi.   Abdominal:      General: There is no distension.      Palpations: Abdomen is soft. There is no mass.      Tenderness: There is no abdominal tenderness.   Musculoskeletal:         General: Normal range of motion.   Lymphadenopathy:      Cervical: No cervical adenopathy.   Skin:     Coloration: Skin is not jaundiced or pale.      Findings: No bruising or erythema.   Neurological:      General: No focal deficit present.      Motor: No weakness.     WBC   Date/Time Value Ref Range Status   04/29/2024 11:35 AM 9.3 4.4 - 11.3 x10*3/uL Final   12/18/2023 11:47 AM 9.2 4.4 - 11.3 x10*3/uL Final   10/30/2023 11:05 AM 7.9 4.4 - 11.3 x10*3/uL Final     nRBC   Date Value Ref Range Status   04/29/2024   Final     Comment:     Not Measured   12/18/2023 0.0 0.0 - 0.0 /100 WBCs Final   10/30/2023   Final     Comment:     Not Measured     RBC   Date Value Ref Range Status   04/29/2024 7.61 (H) 4.50 - 5.90 x10*6/uL Final   12/18/2023 7.56 (H) 4.50 - 5.90 x10*6/uL Final   10/30/2023 7.05 (H) 4.50 - 5.90 x10*6/uL Final     Hemoglobin   Date Value Ref Range Status   04/29/2024 15.4 13.5 - 17.5 g/dL Final   12/18/2023 15.6 13.5 - 17.5 g/dL Final   10/30/2023 15.3 13.5 - 17.5 g/dL Final     Hematocrit   Date Value Ref Range Status   04/29/2024 53.2 (H) 41.0 - 52.0 % Final   12/18/2023 55.3 (H) 41.0 - 52.0 % Final   10/30/2023 51.9 41.0 - 52.0 %  Final     MCV   Date/Time Value Ref Range Status   04/29/2024 11:35 AM 70 (L) 80 - 100 fL Final   12/18/2023 11:47 AM 73 (L) 80 - 100 fL Final   10/30/2023 11:05 AM 74 (L) 80 - 100 fL Final     MCH   Date/Time Value Ref Range Status   04/29/2024 11:35 AM 20.2 (L) 26.0 - 34.0 pg Final   12/18/2023 11:47 AM 20.6 (L) 26.0 - 34.0 pg Final   10/30/2023 11:05 AM 21.7 (L) 26.0 - 34.0 pg Final     MCHC   Date/Time Value Ref Range Status   04/29/2024 11:35 AM 28.9 (L) 32.0 - 36.0 g/dL Final   12/18/2023 11:47 AM 28.2 (L) 32.0 - 36.0 g/dL Final   10/30/2023 11:05 AM 29.5 (L) 32.0 - 36.0 g/dL Final     RDW   Date/Time Value Ref Range Status   04/29/2024 11:35 AM 21.1 (H) 11.5 - 14.5 % Final   12/18/2023 11:47 AM 20.2 (H) 11.5 - 14.5 % Final   10/30/2023 11:05 AM 20.1 (H) 11.5 - 14.5 % Final     Platelets   Date/Time Value Ref Range Status   04/29/2024 11:35  150 - 450 x10*3/uL Final   12/18/2023 11:47  (H) 150 - 450 x10*3/uL Final   10/30/2023 11:05  150 - 450 x10*3/uL Final     MPV   Date/Time Value Ref Range Status   10/30/2023 11:05 AM 9.2 7.5 - 11.5 fL Final   12/21/2022 03:38 PM 9.6 7.0 - 12.6 CU Final   12/15/2022 10:12 AM 9.8 7.0 - 12.6 CU Final     Neutrophils %   Date/Time Value Ref Range Status   04/29/2024 11:35 AM 75.2 40.0 - 80.0 % Final   12/18/2023 11:47 AM 72.0 40.0 - 80.0 % Final   10/30/2023 11:05 AM 72.6 40.0 - 80.0 % Final     Immature Granulocytes %, Automated   Date/Time Value Ref Range Status   04/29/2024 11:35 AM 0.2 0.0 - 0.9 % Final     Comment:     Immature Granulocyte Count (IG) includes promyelocytes, myelocytes and metamyelocytes but does not include bands. Percent differential counts (%) should be interpreted in the context of the absolute cell counts (cells/UL).   12/18/2023 11:47 AM 0.4 0.0 - 0.9 % Final     Comment:     Immature Granulocyte Count (IG) includes promyelocytes, myelocytes and metamyelocytes but does not include bands. Percent differential counts (%) should be  interpreted in the context of the absolute cell counts (cells/UL).   10/30/2023 11:05 AM 0.1 0.0 - 0.9 % Final     Comment:     Immature Granulocyte Count (IG) includes promyelocytes, myelocytes and metamyelocytes but does not include bands. Percent differential counts (%) should be interpreted in the context of the absolute cell counts (cells/UL).     Lymphocytes %   Date/Time Value Ref Range Status   04/29/2024 11:35 AM 14.0 13.0 - 44.0 % Final   12/18/2023 11:47 AM 13.4 13.0 - 44.0 % Final   10/30/2023 11:05 AM 14.6 13.0 - 44.0 % Final     Monocytes %   Date/Time Value Ref Range Status   04/29/2024 11:35 AM 5.0 2.0 - 10.0 % Final   12/18/2023 11:47 AM 7.1 2.0 - 10.0 % Final   10/30/2023 11:05 AM 6.2 2.0 - 10.0 % Final     Eosinophils %   Date/Time Value Ref Range Status   04/29/2024 11:35 AM 4.7 0.0 - 6.0 % Final   12/18/2023 11:47 AM 5.1 0.0 - 6.0 % Final   10/30/2023 11:05 AM 5.2 0.0 - 6.0 % Final     Basophils %   Date/Time Value Ref Range Status   04/29/2024 11:35 AM 0.9 0.0 - 2.0 % Final   12/18/2023 11:47 AM 2.0 0.0 - 2.0 % Final   10/30/2023 11:05 AM 1.3 0.0 - 2.0 % Final     Neutrophils Absolute   Date/Time Value Ref Range Status   04/29/2024 11:35 AM 6.97 1.20 - 7.70 x10*3/uL Final     Comment:     Percent differential counts (%) should be interpreted in the context of the absolute cell counts (cells/uL).   12/18/2023 11:47 AM 6.64 1.20 - 7.70 x10*3/uL Final     Comment:     Percent differential counts (%) should be interpreted in the context of the absolute cell counts (cells/uL).   10/30/2023 11:05 AM 5.71 1.20 - 7.70 x10*3/uL Final     Comment:     Percent differential counts (%) should be interpreted in the context of the absolute cell counts (cells/uL).     Immature Granulocytes Absolute, Automated   Date/Time Value Ref Range Status   04/29/2024 11:35 AM 0.02 0.00 - 0.70 x10*3/uL Final   12/18/2023 11:47 AM 0.04 0.00 - 0.70 x10*3/uL Final   10/30/2023 11:05 AM 0.01 0.00 - 0.70 x10*3/uL Final  "    Lymphocytes Absolute   Date/Time Value Ref Range Status   04/29/2024 11:35 AM 1.30 1.20 - 4.80 x10*3/uL Final   12/18/2023 11:47 AM 1.23 1.20 - 4.80 x10*3/uL Final   10/30/2023 11:05 AM 1.15 (L) 1.20 - 4.80 x10*3/uL Final     Monocytes Absolute   Date/Time Value Ref Range Status   04/29/2024 11:35 AM 0.46 0.10 - 1.00 x10*3/uL Final   12/18/2023 11:47 AM 0.65 0.10 - 1.00 x10*3/uL Final   10/30/2023 11:05 AM 0.49 0.10 - 1.00 x10*3/uL Final     Eosinophils Absolute   Date/Time Value Ref Range Status   04/29/2024 11:35 AM 0.44 0.00 - 0.70 x10*3/uL Final   12/18/2023 11:47 AM 0.47 0.00 - 0.70 x10*3/uL Final   10/30/2023 11:05 AM 0.41 0.00 - 0.70 x10*3/uL Final     Basophils Absolute   Date/Time Value Ref Range Status   04/29/2024 11:35 AM 0.08 0.00 - 0.10 x10*3/uL Final     Comment:     Automated WBC differential has been confirmed by manual smear.   12/18/2023 11:47 AM 0.18 (H) 0.00 - 0.10 x10*3/uL Final   10/30/2023 11:05 AM 0.10 0.00 - 0.10 x10*3/uL Final     Comment:     Automated WBC differential has been confirmed by manual smear.       No components found for: \"PT\"  aPTT   Date/Time Value Ref Range Status   09/13/2022 08:53 AM 38 26 - 39 sec Corrected     Comment:       THE APTT IS NO LONGER USED FOR MONITORING     UNFRACTIONATED HEPARIN THERAPY.    FOR MONITORING HEPARIN THERAPY,     USE THE HEPARIN ASSAY.  Patient's HCT is greater than 55%. Coagulation testing may be affected.     05/20/2021 11:10 PM 44.8 (H) 22.0 - 32.5 SEC Final     Comment:     Performed at [address] [place] [address]   05/20/2021 09:04 .7 (H) 22.0 - 32.5 SEC Final     Comment:     RESULT CHECKED   VERIPHY  Performed at [address] [address]         Assessment/Plan    Sena Saunders viral acute illness would account for previously seen abnormalities of CBC and his symptoms of fevers, night sweats, myalgias and arthralgias.  He no longer has  dizziness.  The headaches, blurry visiion ad " tinnitis are resolved. He no longer has continued fatigue which would not be abnormal for EBV viral infection.   WBC has normalized, he is no longer anemia and platelets are normal.   Will see him back in one year.  He does have asymptomatic SOFIA 2 to watch     CT 12/22/22 showed numerous fissural nodules which were unchanged, but new 0.7cm LLL nodule.  8/15/23 repeat CT scan  showed nodular densities and right fissural lymph node.  He has had two years or stability /or reduction of lung nodules.     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  Can be monitored with routing CBC.     Cardiac disease with multiple MI, CABG X3 will follow with cardiology    Plan:    OV 12 months with CBC       Diagnoses and all orders for this visit:  Sena Barr virus infection  -     Vitamin B12; Future  -     Clinic Appointment Request Follow up  Microcytosis  -     CBC and Auto Differential  -     Ferritin  -     Iron and TIBC  -     CBC and Auto Differential; Future  -     Comprehensive Metabolic Panel; Future  -     Ferritin; Future  -     Iron and TIBC; Future  -     Vitamin B12; Future  -     Clinic Appointment Request; Future           Diya Valdez PA-C

## 2024-04-30 LAB — VIT B12 SERPL-MCNC: 495 PG/ML (ref 211–911)

## 2024-05-01 ENCOUNTER — TELEPHONE (OUTPATIENT)
Dept: PRIMARY CARE | Facility: CLINIC | Age: 69
End: 2024-05-01
Payer: MEDICARE

## 2024-05-01 DIAGNOSIS — R79.0 LOW FERRITIN: ICD-10-CM

## 2024-05-01 DIAGNOSIS — R10.84 GENERALIZED ABDOMINAL PAIN: ICD-10-CM

## 2024-05-01 DIAGNOSIS — M25.561 ACUTE PAIN OF BOTH KNEES: ICD-10-CM

## 2024-05-01 DIAGNOSIS — M25.562 ACUTE PAIN OF BOTH KNEES: ICD-10-CM

## 2024-05-01 NOTE — TELEPHONE ENCOUNTER
----- Message from Pa Riley DO sent at 5/1/2024 10:31 AM EDT -----  Since patient is having stomach pain and low iron (concern for GI ulcer) - recommend referral to Dr. Manley - Dx: abdominal pain, low ferritin  ----- Message -----  From: Tanna Rao MA  Sent: 5/1/2024   9:30 AM EDT  To: Pa Riley DO    Spoke to daughter, she states he has not had colonoscopy or EGD and he will not do the colonoscopy, he will not due the hemoccult cards either. He sees hematology but does not have GI specialist. Also needs new referral for bilateral knee pain, I can place please advise which ortho. Has been having stomach pain per daughter.

## 2024-06-14 ENCOUNTER — HOSPITAL ENCOUNTER (OUTPATIENT)
Dept: RADIOLOGY | Facility: HOSPITAL | Age: 69
Discharge: HOME | End: 2024-06-14
Payer: MEDICARE

## 2024-06-14 ENCOUNTER — APPOINTMENT (OUTPATIENT)
Dept: ORTHOPEDIC SURGERY | Facility: CLINIC | Age: 69
End: 2024-06-14
Payer: MEDICARE

## 2024-06-14 DIAGNOSIS — M25.562 ACUTE BILATERAL KNEE PAIN: ICD-10-CM

## 2024-06-14 DIAGNOSIS — M25.561 ACUTE BILATERAL KNEE PAIN: Primary | ICD-10-CM

## 2024-06-14 DIAGNOSIS — M25.561 ACUTE BILATERAL KNEE PAIN: ICD-10-CM

## 2024-06-14 DIAGNOSIS — M25.561 BILATERAL KNEE PAIN: ICD-10-CM

## 2024-06-14 DIAGNOSIS — M25.562 BILATERAL KNEE PAIN: ICD-10-CM

## 2024-06-14 DIAGNOSIS — M25.562 ACUTE BILATERAL KNEE PAIN: Primary | ICD-10-CM

## 2024-06-14 PROCEDURE — 73562 X-RAY EXAM OF KNEE 3: CPT | Mod: RT

## 2024-06-14 PROCEDURE — 73560 X-RAY EXAM OF KNEE 1 OR 2: CPT | Mod: LT

## 2024-06-14 RX ORDER — TRIAMCINOLONE ACETONIDE 40 MG/ML
1 INJECTION, SUSPENSION INTRA-ARTICULAR; INTRAMUSCULAR
Status: COMPLETED | OUTPATIENT
Start: 2024-06-14 | End: 2024-06-14

## 2024-06-14 ASSESSMENT — PAIN SCALES - GENERAL: PAINLEVEL_OUTOF10: 10 - WORST POSSIBLE PAIN

## 2024-06-14 ASSESSMENT — PAIN - FUNCTIONAL ASSESSMENT: PAIN_FUNCTIONAL_ASSESSMENT: 0-10

## 2024-06-14 NOTE — PROGRESS NOTES
This is a consultation from Dr. Pa Riley DO for   Chief Complaint   Patient presents with    Left Knee - Pain    Right Knee - Pain       This is a 68 y.o. male who presents for evaluation of right knee pain.  Patient states has had problem with his right knee for many years, this is chronic issues been recent exacerbated.  Complains of sharp pain over the medial knee worse with walking proving with rest.  Has never had injections or surgery.  He occasionally takes over-the-counter anti-inflammatories.  Has not done any recent therapy.  He does not use a brace or cane.  No pain or numbness going down his leg.    Physical Exam    There has been no interval change in this patient's past medical, surgical, medications, allergies, family history or social history since the most recent visit to a provider within our department. 14 point review of systems was performed, reviewed, and negative except for pertinent positives documented in the history of present illness.     Constitutional: well developed, well nourished male in no acute distress  Psychiatric: normal mood, appropriate affect  Eyes: sclera anicteric  HENT: normocephalic/atraumatic  CV: regular rate and rhythm   Respiratory: non labored breathing  Integumentary: no rash  Neurological: moves all extremities    Right knee exam: skin intact no lacerations or abrations.  1+ effusion.  Tender medial joint line. negative log roll negative patellar grind. ROM 5-100 stable to varus and valgus stress at 0 and 30 degrees. negative lachman negative posterior drawer negative mojgan. 5/5 ehl/fhl/gs/ta. silt s/s/sp/dp/t. 2+ dp/pt        Xrays were ordered by me, they were reviewed and independently interpreted by me today, they show severe degenerative disease bone-on-bone arthritis in the right knee    L Inj/Asp: R knee on 6/14/2024 10:51 AM  Indications: pain and joint swelling  Details: 22 G needle, anterolateral approach  Medications: 1 mL triamcinolone  acetonide 40 mg/mL    Discussion:  I discussed the conservative treatment options for knee osteoarthritis including but not limited to physical therapy, oral NSAIDS, activity and lifestyle modification, and corticosteroid injections. Pt has elected to undergo a cortisone injection today. I have explained the risk and benefits of an injection including the possibility of joint infection, bleeding, damage to cartilage, allergic reaction. Patient verbalized understanding and gave verbal consent wishes to proceed with a intra-articular cortisone injection for their knee.    Procedure:  After discussing the risk and benefits of the procedure, we proceeded with an intra-articular right knee injection. We discussed the risks and benefits and potential morbidity related to the treatment, and to the prescription medication administered in the injection    With the patient's informed verbal consent, the right knee was prepped in standard sterile fashion with Chlorhexidine. The skin was then anesthetized with ethyl chloride spray and cleaned again with Chlorhexidine. The knee was then apirated/injected with a prefilled 20-gauge syringe of 40 mg Kenalog + 4 ml Lidocaine using the lateral approach without complications.  The patient tolerated this well and felt immediate initial relief of symptoms. A bandaid was applied and the patient ambulated out of the clinic on ther own accord without difficulty. Patient was instructed to avoid physical activity for 24-48 hours to prevent the knees from swelling and may ice the knees as tolerated. Patient should contact the office if any signs of of infection appear: redness, fever, chills, drainage, swelling or warmth to the knees.  Pt understands that the injections can be repeated no sooner than 3 months.    Procedure, treatment alternatives, risks and benefits explained, specific risks discussed. Consent was given by the patient. Immediately prior to procedure a time out was called to  "verify the correct patient, procedure, equipment, support staff and site/side marked as required. Patient was prepped and draped in the usual sterile fashion.             Impression/Plan: This is a 68 y.o. male with severe right knee arthritis.  I had an in depth discussion with the patient regarding treatment options for arthritis and their relative risks and benefits. We reviewed surgical and nonsurgical option for treatment. Treatments include anti inflammatory medications, physical therapy, weight loss, activity modification, use of assistive devices, injection therapies. We discussed current prescriptions and risks and benefits of continuation of prescription medication as apporpriate. We discussed that arthritis is often progressive over time, an in end stage arthritis surgical interventions can be considered, including arthroplasty. All questions were answered and the patient voiced their understanding.  I will see him back.    BMI Readings from Last 1 Encounters:   04/29/24 37.82 kg/m²      Lab Results   Component Value Date    CREATININE 1.05 12/18/2023     Tobacco Use: Medium Risk (6/14/2024)    Patient History     Smoking Tobacco Use: Former     Smokeless Tobacco Use: Never     Passive Exposure: Not on file      Computed MELD 3.0 unavailable. One or more values for this score either were not found within the given timeframe or did not fit some other criterion.  Computed MELD-Na unavailable. One or more values for this score either were not found within the given timeframe or did not fit some other criterion.       Lab Results   Component Value Date    HGBA1C 5.5 12/18/2023     No results found for: \"STAPHMRSASCR\"  "

## 2024-06-19 ENCOUNTER — APPOINTMENT (OUTPATIENT)
Dept: PRIMARY CARE | Facility: CLINIC | Age: 69
End: 2024-06-19
Payer: MEDICARE

## 2024-06-25 ENCOUNTER — APPOINTMENT (OUTPATIENT)
Dept: GASTROENTEROLOGY | Facility: CLINIC | Age: 69
End: 2024-06-25
Payer: MEDICARE

## 2024-06-25 VITALS — OXYGEN SATURATION: 96 % | HEART RATE: 86 BPM | WEIGHT: 282 LBS | HEIGHT: 72 IN | BODY MASS INDEX: 38.19 KG/M2

## 2024-06-25 DIAGNOSIS — K21.9 GASTROESOPHAGEAL REFLUX DISEASE WITHOUT ESOPHAGITIS: ICD-10-CM

## 2024-06-25 DIAGNOSIS — Z12.12 ENCOUNTER FOR COLORECTAL CANCER SCREENING: Primary | ICD-10-CM

## 2024-06-25 DIAGNOSIS — Z12.11 ENCOUNTER FOR COLORECTAL CANCER SCREENING: Primary | ICD-10-CM

## 2024-06-25 DIAGNOSIS — Z80.0 FAMILY HISTORY OF COLON CANCER: ICD-10-CM

## 2024-06-25 DIAGNOSIS — R79.0 LOW FERRITIN: ICD-10-CM

## 2024-06-25 DIAGNOSIS — R10.84 GENERALIZED ABDOMINAL PAIN: ICD-10-CM

## 2024-06-25 PROCEDURE — 1157F ADVNC CARE PLAN IN RCRD: CPT | Performed by: INTERNAL MEDICINE

## 2024-06-25 PROCEDURE — 99204 OFFICE O/P NEW MOD 45 MIN: CPT | Performed by: INTERNAL MEDICINE

## 2024-06-25 PROCEDURE — 1159F MED LIST DOCD IN RCRD: CPT | Performed by: INTERNAL MEDICINE

## 2024-06-25 RX ORDER — POLYETHYLENE GLYCOL 3350, SODIUM SULFATE ANHYDROUS, SODIUM BICARBONATE, SODIUM CHLORIDE, POTASSIUM CHLORIDE 236; 22.74; 6.74; 5.86; 2.97 G/4L; G/4L; G/4L; G/4L; G/4L
4000 POWDER, FOR SOLUTION ORAL ONCE
Qty: 4000 ML | Refills: 0 | Status: SHIPPED | OUTPATIENT
Start: 2024-06-25 | End: 2024-06-25

## 2024-06-25 RX ORDER — PREDNISONE 20 MG/1
1 TABLET ORAL
COMMUNITY
Start: 2024-06-21 | End: 2024-06-28 | Stop reason: ALTCHOICE

## 2024-06-25 ASSESSMENT — ENCOUNTER SYMPTOMS: ABDOMINAL PAIN: 1

## 2024-06-25 NOTE — PATIENT INSTRUCTIONS
High-fiber diet  Continue Metamucil, stool softener  MiraLAX as needed.  EGD, colonoscopy  May add Bentyl later on after colonoscopy  Follow-up with GI in 3 to 4 months  Please communicate with cardiologist if prasugrel can be held 7 days prior to EGD, colonoscopy.  Continue follow-up with PMD

## 2024-06-25 NOTE — PROGRESS NOTES
Chief Complaint: Tuan Wood is a 68 y.o. male who presents for Abdominal Pain (Pain in sides and mid-section.).  Abdominal Pain  Associated Symptoms:    constipation          68-year-old gentleman with history of CAD S/P CABG S/P multiple stent placement, last stent placement in 2020-on Effient, hypertension, GERD obesity, JAK2 mutation, ?eosinophilia, lumbar radiculopathy S/P surgery came to GI clinic as a referral from PMD for further evaluation due to abdominal pain.  Patient is poor historian.  Most of informations  obtained from his daughter.  He reports Diffuse abdominal pain for years-variable intensity sometimes can be 8-10/10, he thinks omeprazole helps to improve pain.  He also describes GERD with  symptom of heartburn, stable with omeprazole 20 mg daily, unable to wean off.  He also endorses chronic constipation-Metamucil helps to relieve constipation but not 100%.  Sometimes no bowel movement in several days.  Labs shows low ferritin.  No previous EGD, colonoscopy.  Initially he refused colonoscopy subsequently agrees.  Family history of colon cancer: Sister had colon cancer at around 50, father at around 80.  Lab: B12 within normal limit, iron 30, iron saturation 8%, MCV 70, H&H 15.4/53.2, platelet 405, CMP within normal limit, hemoglobin A1c 5.5  Review of Systems   Gastrointestinal:  Positive for abdominal pain and constipation.     12 Point ROS negative outside of symptoms stated above in HPI    Past Medical History:   Diagnosis Date    Old myocardial infarction     History of myocardial infarction    Personal history of other diseases of the circulatory system     History of hypertension    Personal history of pulmonary embolism     History of pulmonary embolism       Past Surgical History:   Procedure Laterality Date    CARDIAC CATHETERIZATION      CORONARY ARTERY BYPASS GRAFT      CORONARY STENT PLACEMENT      OTHER SURGICAL HISTORY      Heart surgery    OTHER SURGICAL HISTORY      Hernia  repair    OTHER SURGICAL HISTORY      Coronary artery bypass graft    SPINAL FUSION         No relevant family history has been documented for this patient.     reports that he quit smoking about 25 years ago. His smoking use included cigarettes. He has never used smokeless tobacco. He reports current alcohol use. He reports that he does not currently use drugs.    No Known Allergies    Imaging  XR knee right 3 views    Result Date: 6/15/2024  Interpreted By:  Karthik Alcantara, STUDY: XR KNEE RIGHT 3 VIEWS; XR KNEE LEFT 1-2 VIEWS   INDICATION: Signs/Symptoms:knee pain; Signs/Symptoms:bilateral knee pain.   COMPARISON: None   ACCESSION NUMBER(S): [id number]; [id number]   ORDERING CLINICIAN: MARTIR CARTER   FINDINGS: Advanced osteoarthritis of the bilateral knees right worse than left, particularly in the medial compartments.   No evidence of fracture or lesion.       Advanced osteoarthritis bilateral knees right worse than left.   Signed by: Karthik Alcantara 6/15/2024 10:11 AM Dictation workstation:   [id number]    XR knee left 1-2 views    Result Date: 6/15/2024  Interpreted By:  Karthik Alcantara, STUDY: XR KNEE RIGHT 3 VIEWS; XR KNEE LEFT 1-2 VIEWS   INDICATION: Signs/Symptoms:knee pain; Signs/Symptoms:bilateral knee pain.   COMPARISON: None   ACCESSION NUMBER(S): [id number]; [id number]   ORDERING CLINICIAN: MARTIR CARTER   FINDINGS: Advanced osteoarthritis of the bilateral knees right worse than left, particularly in the medial compartments.   No evidence of fracture or lesion.       Advanced osteoarthritis bilateral knees right worse than left.   Signed by: Karthik Alcantara 6/15/2024 10:11 AM Dictation workstation:   [id number]        Laboratory  No results found for this or any previous visit (from the past 96 hour(s)).     Objective       Current Outpatient Medications:     predniSONE (Deltasone) 20 mg tablet, Take 1 tablet (20 mg) by mouth early in the morning.., Disp: , Rfl:     amLODIPine (Norvasc) 5 mg  "tablet, Take 1 tablet (5 mg) by mouth once daily., Disp: 90 tablet, Rfl: 3    atorvastatin (Lipitor) 40 mg tablet, Take 1 tablet by mouth once daily, Disp: 90 tablet, Rfl: 0    carvedilol (Coreg) 6.25 mg tablet, Take 1 tablet (6.25 mg) by mouth 2 times a day., Disp: , Rfl:     nitroglycerin (Nitrostat) 0.4 mg SL tablet, 1 tablet (0.4 mg).  every 5 min as needed may repeat up to 3x before seeking medical attention Sublingual as directed, Disp: , Rfl:     olopatadine (Pataday Once Daily Relief) 0.2 % ophthalmic solution, Administer 1 drop into both eyes once daily., Disp: 5 mL, Rfl: 2    omeprazole (PriLOSEC) 20 mg DR capsule, Take 1 capsule (20 mg) by mouth once daily. Do not crush or chew., Disp: 90 capsule, Rfl: 3    prasugrel (Effient) 10 mg tablet, Take 1 tablet (10 mg) by mouth once daily., Disp: , Rfl:     Last Recorded Vitals  Pulse 86, height 1.831 m (6' 0.09\"), weight 128 kg (282 lb), SpO2 96%.    Physical Exam  HENT:      Mouth/Throat:      Mouth: Mucous membranes are moist.   Eyes:      General: No scleral icterus.  Cardiovascular:      Rate and Rhythm: Normal rate and regular rhythm.      Comments: Healed scar  Pulmonary:      Effort: Pulmonary effort is normal. No respiratory distress.      Breath sounds: Normal breath sounds.   Abdominal:      General: Abdomen is flat. Bowel sounds are normal.      Palpations: Abdomen is soft.      Tenderness: There is abdominal tenderness.      Comments: Mild epigastric tenderness.  Abdominal obesity, limited exam   Musculoskeletal:      Right lower leg: Edema present.      Left lower leg: Edema present.      Comments: Trace    Skin:     Coloration: Skin is not jaundiced.   Neurological:      Mental Status: He is alert and oriented to person, place, and time.         Assessment/Plan    Chronic GERD-unable to wean off PPI.  Microcytosis with iron deficiency.  Family history: Sister was diagnosed colon cancer at around age 50, mother was diagnosed colon cancer around age " 80.  Abdominal pain-nonspecific.  Chronic constipation.    High-fiber diet  Continue Metamucil, stool softener  MiraLAX as needed.  EGD, colonoscopy  May add Bentyl later on after colonoscopy  Follow-up with GI in 3 to 4 months  Please communicate with cardiologist if prasugrel can be held 7 days prior to EGD, colonoscopy.  Continue follow-up with PMD

## 2024-06-26 PROBLEM — R55 NEAR SYNCOPE: Status: RESOLVED | Noted: 2024-04-10 | Resolved: 2024-06-26

## 2024-06-26 ASSESSMENT — ENCOUNTER SYMPTOMS: CONSTIPATION: 1

## 2024-06-27 PROBLEM — E78.2 MIXED HYPERLIPIDEMIA: Status: ACTIVE | Noted: 2023-08-25

## 2024-06-28 ENCOUNTER — OFFICE VISIT (OUTPATIENT)
Dept: PRIMARY CARE | Facility: CLINIC | Age: 69
End: 2024-06-28
Payer: MEDICARE

## 2024-06-28 ENCOUNTER — LAB (OUTPATIENT)
Dept: LAB | Facility: LAB | Age: 69
End: 2024-06-28
Payer: MEDICARE

## 2024-06-28 ENCOUNTER — HOSPITAL ENCOUNTER (OUTPATIENT)
Dept: RADIOLOGY | Facility: CLINIC | Age: 69
Discharge: HOME | End: 2024-06-28
Payer: MEDICARE

## 2024-06-28 VITALS
TEMPERATURE: 100 F | HEART RATE: 94 BPM | SYSTOLIC BLOOD PRESSURE: 134 MMHG | OXYGEN SATURATION: 97 % | DIASTOLIC BLOOD PRESSURE: 88 MMHG | BODY MASS INDEX: 38.2 KG/M2 | WEIGHT: 282.4 LBS

## 2024-06-28 DIAGNOSIS — L29.9 GENERALIZED PRURITUS: Primary | ICD-10-CM

## 2024-06-28 DIAGNOSIS — R05.9 COUGH, UNSPECIFIED TYPE: ICD-10-CM

## 2024-06-28 DIAGNOSIS — I10 BENIGN HYPERTENSION: ICD-10-CM

## 2024-06-28 DIAGNOSIS — L29.9 GENERALIZED PRURITUS: ICD-10-CM

## 2024-06-28 LAB
ALBUMIN SERPL BCP-MCNC: 4.4 G/DL (ref 3.4–5)
ALP SERPL-CCNC: 69 U/L (ref 33–136)
ALT SERPL W P-5'-P-CCNC: 40 U/L (ref 10–52)
ANION GAP SERPL CALC-SCNC: 12 MMOL/L (ref 10–20)
AST SERPL W P-5'-P-CCNC: 28 U/L (ref 9–39)
BASOPHILS # BLD AUTO: 0.19 X10*3/UL (ref 0–0.1)
BASOPHILS NFR BLD AUTO: 1.7 %
BILIRUB SERPL-MCNC: 1 MG/DL (ref 0–1.2)
BUN SERPL-MCNC: 15 MG/DL (ref 6–23)
CALCIUM SERPL-MCNC: 10.2 MG/DL (ref 8.6–10.3)
CHLORIDE SERPL-SCNC: 102 MMOL/L (ref 98–107)
CO2 SERPL-SCNC: 25 MMOL/L (ref 21–32)
CREAT SERPL-MCNC: 1.13 MG/DL (ref 0.5–1.3)
DACRYOCYTES BLD QL SMEAR: NORMAL
EGFRCR SERPLBLD CKD-EPI 2021: 72 ML/MIN/1.73M*2
EOSINOPHIL # BLD AUTO: 0.38 X10*3/UL (ref 0–0.7)
EOSINOPHIL NFR BLD AUTO: 3.3 %
ERYTHROCYTE [DISTWIDTH] IN BLOOD BY AUTOMATED COUNT: 22.1 % (ref 11.5–14.5)
ERYTHROCYTE [SEDIMENTATION RATE] IN BLOOD BY WESTERGREN METHOD: 59 MM/H (ref 0–20)
GIANT PLATELETS BLD QL SMEAR: NORMAL
GLUCOSE SERPL-MCNC: 102 MG/DL (ref 74–99)
HCT VFR BLD AUTO: 55.6 % (ref 41–52)
HGB BLD-MCNC: 15.7 G/DL (ref 13.5–17.5)
IMM GRANULOCYTES # BLD AUTO: 0.04 X10*3/UL (ref 0–0.7)
IMM GRANULOCYTES NFR BLD AUTO: 0.3 % (ref 0–0.9)
LYMPHOCYTES # BLD AUTO: 1.47 X10*3/UL (ref 1.2–4.8)
LYMPHOCYTES NFR BLD AUTO: 12.8 %
MCH RBC QN AUTO: 20.3 PG (ref 26–34)
MCHC RBC AUTO-ENTMCNC: 28.2 G/DL (ref 32–36)
MCV RBC AUTO: 72 FL (ref 80–100)
MONOCYTES # BLD AUTO: 0.73 X10*3/UL (ref 0.1–1)
MONOCYTES NFR BLD AUTO: 6.3 %
NEUTROPHILS # BLD AUTO: 8.69 X10*3/UL (ref 1.2–7.7)
NEUTROPHILS NFR BLD AUTO: 75.6 %
NRBC BLD-RTO: 0.2 /100 WBCS (ref 0–0)
OVALOCYTES BLD QL SMEAR: NORMAL
PLATELET # BLD AUTO: 456 X10*3/UL (ref 150–450)
POTASSIUM SERPL-SCNC: 4.1 MMOL/L (ref 3.5–5.3)
PROT SERPL-MCNC: 7.6 G/DL (ref 6.4–8.2)
RBC # BLD AUTO: 7.72 X10*6/UL (ref 4.5–5.9)
RBC MORPH BLD: NORMAL
SODIUM SERPL-SCNC: 135 MMOL/L (ref 136–145)
TSH SERPL-ACNC: 2.72 MIU/L (ref 0.44–3.98)
WBC # BLD AUTO: 11.5 X10*3/UL (ref 4.4–11.3)

## 2024-06-28 PROCEDURE — 1160F RVW MEDS BY RX/DR IN RCRD: CPT | Performed by: FAMILY MEDICINE

## 2024-06-28 PROCEDURE — 3079F DIAST BP 80-89 MM HG: CPT | Performed by: FAMILY MEDICINE

## 2024-06-28 PROCEDURE — 84443 ASSAY THYROID STIM HORMONE: CPT

## 2024-06-28 PROCEDURE — G2211 COMPLEX E/M VISIT ADD ON: HCPCS | Performed by: FAMILY MEDICINE

## 2024-06-28 PROCEDURE — 99213 OFFICE O/P EST LOW 20 MIN: CPT | Performed by: FAMILY MEDICINE

## 2024-06-28 PROCEDURE — 86038 ANTINUCLEAR ANTIBODIES: CPT

## 2024-06-28 PROCEDURE — 1126F AMNT PAIN NOTED NONE PRSNT: CPT | Performed by: FAMILY MEDICINE

## 2024-06-28 PROCEDURE — 1157F ADVNC CARE PLAN IN RCRD: CPT | Performed by: FAMILY MEDICINE

## 2024-06-28 PROCEDURE — 80053 COMPREHEN METABOLIC PANEL: CPT

## 2024-06-28 PROCEDURE — 85652 RBC SED RATE AUTOMATED: CPT

## 2024-06-28 PROCEDURE — 1036F TOBACCO NON-USER: CPT | Performed by: FAMILY MEDICINE

## 2024-06-28 PROCEDURE — 1159F MED LIST DOCD IN RCRD: CPT | Performed by: FAMILY MEDICINE

## 2024-06-28 PROCEDURE — 3075F SYST BP GE 130 - 139MM HG: CPT | Performed by: FAMILY MEDICINE

## 2024-06-28 PROCEDURE — 85025 COMPLETE CBC W/AUTO DIFF WBC: CPT

## 2024-06-28 PROCEDURE — 71046 X-RAY EXAM CHEST 2 VIEWS: CPT

## 2024-06-28 PROCEDURE — 36415 COLL VENOUS BLD VENIPUNCTURE: CPT

## 2024-06-28 RX ORDER — HYDROXYZINE HYDROCHLORIDE 25 MG/1
25 TABLET, FILM COATED ORAL 3 TIMES DAILY
Qty: 90 TABLET | Refills: 0 | Status: SHIPPED | OUTPATIENT
Start: 2024-06-28 | End: 2024-07-28

## 2024-06-28 ASSESSMENT — ENCOUNTER SYMPTOMS
DEPRESSION: 0
LOSS OF SENSATION IN FEET: 0
OCCASIONAL FEELINGS OF UNSTEADINESS: 0

## 2024-06-28 ASSESSMENT — PATIENT HEALTH QUESTIONNAIRE - PHQ9
2. FEELING DOWN, DEPRESSED OR HOPELESS: NOT AT ALL
SUM OF ALL RESPONSES TO PHQ9 QUESTIONS 1 & 2: 0
1. LITTLE INTEREST OR PLEASURE IN DOING THINGS: NOT AT ALL

## 2024-06-28 ASSESSMENT — LIFESTYLE VARIABLES
AUDIT-C TOTAL SCORE: 4
HOW OFTEN DO YOU HAVE A DRINK CONTAINING ALCOHOL: 2-4 TIMES A MONTH
SKIP TO QUESTIONS 9-10: 0
HOW MANY STANDARD DRINKS CONTAINING ALCOHOL DO YOU HAVE ON A TYPICAL DAY: 3 OR 4
HOW OFTEN DO YOU HAVE SIX OR MORE DRINKS ON ONE OCCASION: LESS THAN MONTHLY

## 2024-06-28 ASSESSMENT — PAIN SCALES - GENERAL: PAINLEVEL: 0-NO PAIN

## 2024-06-28 NOTE — PROGRESS NOTES
Subjective   Patient ID: Tuan Wood is a 68 y.o. male who presents for Skin Itching.    Pt is here for diffuse pruritus.  Worse after bathing.  Went to urgent care - given steroid.  No benefit.  Tried benadrl - no benefit.  Located on back, chest, arms and upper legs.  No rash.  Started 3 months ago.  Changed detergent, soaps.          Review of Systems    Objective   /88 (BP Location: Right arm, Patient Position: Sitting)   Pulse 94   Temp 37.8 °C (100 °F) (Temporal)   Wt 128 kg (282 lb 6.4 oz)   SpO2 97%   BMI 38.20 kg/m²     Physical Exam  Vitals reviewed.   Constitutional:       General: He is not in acute distress.  Eyes:      General:         Right eye: No discharge.         Left eye: No discharge.   Cardiovascular:      Rate and Rhythm: Normal rate and regular rhythm.   Pulmonary:      Effort: Pulmonary effort is normal.      Breath sounds: No wheezing or rhonchi.   Musculoskeletal:      Right lower leg: No edema.      Left lower leg: No edema.   Lymphadenopathy:      Cervical: No cervical adenopathy.   Skin:     Findings: No rash.      Comments: Positive dermatographism   Neurological:      Mental Status: He is alert.         Assessment/Plan   Diagnoses and all orders for this visit:  Generalized pruritus  -     CBC and Auto Differential; Future  -     Comprehensive Metabolic Panel; Future  -     TSH with reflex to Free T4 if abnormal; Future  -     Sedimentation Rate; Future  -     ROBBY with Reflex to DIAMOND; Future  -     hydrOXYzine HCL (Atarax) 25 mg tablet; Take 1 tablet (25 mg) by mouth 3 times a day.  Benign hypertension  -     CBC and Auto Differential; Future  -     Comprehensive Metabolic Panel; Future  -     TSH with reflex to Free T4 if abnormal; Future  Cough, unspecified type  -     XR chest 2 views; Future  Other orders  -     Follow Up In Primary Care - Established    Patient Instructions   Here for generalized pruritus - caused could be hematologic (blood related), liver disease,  kidney disease, autoimmune or underlying malignancy.  Recommend blood work to rule out above causes.  Recommend chest xray.  We will call with results and further evaluation.      For itching - use hydroxyzine three times a day for itching.

## 2024-06-28 NOTE — PATIENT INSTRUCTIONS
Here for generalized pruritus - caused could be hematologic (blood related), liver disease, kidney disease, autoimmune or underlying malignancy.  Recommend blood work to rule out above causes.  Recommend chest xray.  We will call with results and further evaluation.      For itching - use hydroxyzine three times a day for itching.

## 2024-07-02 LAB — ANA SER QL HEP2 SUBST: NEGATIVE

## 2024-07-11 ENCOUNTER — ANESTHESIA EVENT (OUTPATIENT)
Dept: OPERATING ROOM | Facility: HOSPITAL | Age: 69
End: 2024-07-11
Payer: MEDICARE

## 2024-07-12 RX ORDER — ONDANSETRON HYDROCHLORIDE 2 MG/ML
4 INJECTION, SOLUTION INTRAVENOUS ONCE AS NEEDED
Status: CANCELLED | OUTPATIENT
Start: 2024-07-12

## 2024-07-12 RX ORDER — SODIUM CHLORIDE, SODIUM LACTATE, POTASSIUM CHLORIDE, CALCIUM CHLORIDE 600; 310; 30; 20 MG/100ML; MG/100ML; MG/100ML; MG/100ML
100 INJECTION, SOLUTION INTRAVENOUS CONTINUOUS
Status: CANCELLED | OUTPATIENT
Start: 2024-07-12

## 2024-07-12 RX ORDER — DROPERIDOL 2.5 MG/ML
0.62 INJECTION, SOLUTION INTRAMUSCULAR; INTRAVENOUS ONCE AS NEEDED
Status: CANCELLED | OUTPATIENT
Start: 2024-07-12

## 2024-07-15 ENCOUNTER — ANESTHESIA (OUTPATIENT)
Dept: OPERATING ROOM | Facility: HOSPITAL | Age: 69
End: 2024-07-15
Payer: MEDICARE

## 2024-07-15 ENCOUNTER — HOSPITAL ENCOUNTER (OUTPATIENT)
Dept: OPERATING ROOM | Facility: HOSPITAL | Age: 69
Setting detail: OUTPATIENT SURGERY
Discharge: HOME | End: 2024-07-15
Payer: MEDICARE

## 2024-07-15 VITALS
WEIGHT: 275.57 LBS | HEIGHT: 72 IN | HEART RATE: 72 BPM | RESPIRATION RATE: 18 BRPM | BODY MASS INDEX: 37.33 KG/M2 | OXYGEN SATURATION: 98 % | TEMPERATURE: 97.2 F | SYSTOLIC BLOOD PRESSURE: 114 MMHG | DIASTOLIC BLOOD PRESSURE: 86 MMHG

## 2024-07-15 DIAGNOSIS — Z12.12 ENCOUNTER FOR COLORECTAL CANCER SCREENING: ICD-10-CM

## 2024-07-15 DIAGNOSIS — K21.9 GASTROESOPHAGEAL REFLUX DISEASE WITHOUT ESOPHAGITIS: ICD-10-CM

## 2024-07-15 DIAGNOSIS — Z80.0 FAMILY HISTORY OF COLON CANCER: ICD-10-CM

## 2024-07-15 DIAGNOSIS — Z12.11 ENCOUNTER FOR COLORECTAL CANCER SCREENING: ICD-10-CM

## 2024-07-15 DIAGNOSIS — R79.0 LOW FERRITIN: ICD-10-CM

## 2024-07-15 PROCEDURE — 7100000009 HC PHASE TWO TIME - INITIAL BASE CHARGE: Performed by: ANESTHESIOLOGY

## 2024-07-15 PROCEDURE — 3600000007 HC OR TIME - EACH INCREMENTAL 1 MINUTE - PROCEDURE LEVEL TWO: Performed by: ANESTHESIOLOGY

## 2024-07-15 PROCEDURE — 2500000004 HC RX 250 GENERAL PHARMACY W/ HCPCS (ALT 636 FOR OP/ED): Performed by: ANESTHESIOLOGY

## 2024-07-15 PROCEDURE — 2500000004 HC RX 250 GENERAL PHARMACY W/ HCPCS (ALT 636 FOR OP/ED): Performed by: NURSE ANESTHETIST, CERTIFIED REGISTERED

## 2024-07-15 PROCEDURE — 88305 TISSUE EXAM BY PATHOLOGIST: CPT | Mod: TC,GEALAB | Performed by: INTERNAL MEDICINE

## 2024-07-15 PROCEDURE — 3600000002 HC OR TIME - INITIAL BASE CHARGE - PROCEDURE LEVEL TWO: Performed by: ANESTHESIOLOGY

## 2024-07-15 PROCEDURE — 3700000002 HC GENERAL ANESTHESIA TIME - EACH INCREMENTAL 1 MINUTE: Performed by: ANESTHESIOLOGY

## 2024-07-15 PROCEDURE — 3700000001 HC GENERAL ANESTHESIA TIME - INITIAL BASE CHARGE: Performed by: ANESTHESIOLOGY

## 2024-07-15 PROCEDURE — 2500000005 HC RX 250 GENERAL PHARMACY W/O HCPCS: Performed by: NURSE ANESTHETIST, CERTIFIED REGISTERED

## 2024-07-15 PROCEDURE — 7100000010 HC PHASE TWO TIME - EACH INCREMENTAL 1 MINUTE: Performed by: ANESTHESIOLOGY

## 2024-07-15 PROCEDURE — 43239 EGD BIOPSY SINGLE/MULTIPLE: CPT | Performed by: INTERNAL MEDICINE

## 2024-07-15 PROCEDURE — 45385 COLONOSCOPY W/LESION REMOVAL: CPT | Performed by: INTERNAL MEDICINE

## 2024-07-15 PROCEDURE — 88305 TISSUE EXAM BY PATHOLOGIST: CPT | Performed by: PATHOLOGY

## 2024-07-15 RX ORDER — PROPOFOL 10 MG/ML
INJECTION, EMULSION INTRAVENOUS AS NEEDED
Status: DISCONTINUED | OUTPATIENT
Start: 2024-07-15 | End: 2024-07-15

## 2024-07-15 RX ORDER — SODIUM CHLORIDE, SODIUM LACTATE, POTASSIUM CHLORIDE, CALCIUM CHLORIDE 600; 310; 30; 20 MG/100ML; MG/100ML; MG/100ML; MG/100ML
100 INJECTION, SOLUTION INTRAVENOUS CONTINUOUS
Status: DISCONTINUED | OUTPATIENT
Start: 2024-07-15 | End: 2024-07-16 | Stop reason: HOSPADM

## 2024-07-15 RX ORDER — LIDOCAINE HCL/PF 100 MG/5ML
SYRINGE (ML) INTRAVENOUS AS NEEDED
Status: DISCONTINUED | OUTPATIENT
Start: 2024-07-15 | End: 2024-07-15

## 2024-07-15 RX ORDER — MIDAZOLAM HYDROCHLORIDE 1 MG/ML
INJECTION INTRAMUSCULAR; INTRAVENOUS AS NEEDED
Status: DISCONTINUED | OUTPATIENT
Start: 2024-07-15 | End: 2024-07-15

## 2024-07-15 RX ORDER — FENTANYL CITRATE 50 UG/ML
INJECTION, SOLUTION INTRAMUSCULAR; INTRAVENOUS AS NEEDED
Status: DISCONTINUED | OUTPATIENT
Start: 2024-07-15 | End: 2024-07-15

## 2024-07-15 SDOH — HEALTH STABILITY: MENTAL HEALTH: CURRENT SMOKER: 0

## 2024-07-15 ASSESSMENT — PAIN SCALES - GENERAL
PAINLEVEL_OUTOF10: 0 - NO PAIN
PAIN_LEVEL: 2
PAINLEVEL_OUTOF10: 0 - NO PAIN

## 2024-07-15 ASSESSMENT — PAIN - FUNCTIONAL ASSESSMENT: PAIN_FUNCTIONAL_ASSESSMENT: 0-10

## 2024-07-15 ASSESSMENT — COLUMBIA-SUICIDE SEVERITY RATING SCALE - C-SSRS
1. IN THE PAST MONTH, HAVE YOU WISHED YOU WERE DEAD OR WISHED YOU COULD GO TO SLEEP AND NOT WAKE UP?: NO
2. HAVE YOU ACTUALLY HAD ANY THOUGHTS OF KILLING YOURSELF?: NO
6. HAVE YOU EVER DONE ANYTHING, STARTED TO DO ANYTHING, OR PREPARED TO DO ANYTHING TO END YOUR LIFE?: NO

## 2024-07-15 NOTE — DISCHARGE INSTRUCTIONS
Patient Instructions after a Colonoscopy      The anesthetics, sedatives or narcotics which were given to you today will be acting in your body for the next 24 hours, so you might feel a little sleepy or groggy.  This feeling should slowly wear off. Carefully read and follow the instructions.     You received sedation today:  - Do not drive or operate any machinery or power tools of any kind.   - No alcoholic beverages today, not even beer or wine.  - Do not make any important decisions or sign any legal documents.  - No over the counter medications that contain alcohol or that may cause drowsiness.  - Do not make any important decisions or sign any legal documents.  - Make sure you have someone with you for first 24 hours.    While it is common to experience mild to moderate abdominal distention, gas, or belching after your procedure, if any of these symptoms occur following discharge from the GI Lab or within one week of having your procedure, call the [facility] to be advised whether a visit to your nearest Urgent Care or Emergency Department is indicated.  Take this paper with you if you go.     - If you develop an allergic reaction to the medications that were given during your procedure such as difficulty breathing, rash, hives, severe nausea, vomiting or lightheadedness.  - If you experience chest pain, shortness of breath, severe abdominal pain, fevers and chills.  -If you develop signs and symptoms of bleeding such as blood in your spit, if your stools turn black, tarry, or bloody  - If you have not urinated within 8 hours following your procedure.  - If your IV site becomes painful, red, inflamed, or looks infected.    If you received a biopsy/polypectomy/sphincterotomy the following instructions apply below:    __ Do not use Aspirin containing products, non-steroidal medications or anti-coagulants for one week following your procedure. (Examples of these types of medications are: Advil,  Arthrotec, Aleve, Coumadin, Ecotrin, Heparin, Ibuprofen, Indocin, Motrin, Naprosyn, Nuprin, Plavix, Vioxx, and Voltarin, or their generic forms.  This list is not all-inclusive.  Check with your physician or pharmacist before resuming medications.)   __ Eat a soft diet today.  Avoid foods that are poorly digested for the next 24 hours.  These foods would include: nuts, beans, lettuce, red meats, and fried foods. Start with liquids and advance your diet as tolerated, gradually work up to eating solids.   __ Do not have a Barium Study or Enema for one week.    Your physician recommends the additional following instructions:    -You have a contact number available for emergencies. The signs and symptoms of potential delayed complications were discussed with you. You may return to normal activities tomorrow.  -Resume your previous diet.  -Continue your present medications.   -We are waiting for your pathology results.  -Your physician has recommended a repeat colonoscopy (date to be determined after pending pathology results are reviewed) for surveillance based on pathology results.  -The findings and recommendations have been discussed with you.  -The findings and recommendations were discussed with your family.  - Please see Medication Reconciliation Form for new medication/medications prescribed.       If you experience any problems or have any questions following discharge from the GI Lab, please call:        Nurse Signature                                                                        Date___________________                                                                            Patient/Responsible Party Signature                                        Date___________________Patient Instructions after an endoscopy or colonoscopy      The anesthetics, sedatives or narcotics which were given to you today will be acting in your body for the next 24 hours, so you might feel a little sleepy or groggy.  This  feeling should slowly wear off. Carefully read and follow the instructions.     You received sedation today:  - Do not drive or operate any machinery or power tools of any kind.   - No alcoholic beverages today, not even beer or wine.  - Do not make any important decisions or sign any legal documents.  - No over the counter medications that contain alcohol or that may cause drowsiness.  - Do not make any important decisions or sign any legal documents.    While it is common to experience mild to moderate abdominal distention, gas, or belching after your procedure, if any of these symptoms occur following discharge from the GI Lab or within one week of having your procedure, call the [facility] to be advised whether a visit to your nearest Urgent Care or Emergency Department is indicated.  Take this paper with you if you go.     - If you develop an allergic reaction to the medications that were given during your procedure such as difficulty breathing, rash, hives, severe nausea, vomiting or lightheadedness.- If you experience chest pain, shortness of breath, severe abdominal pain, fevers and chills.    -If you develop signs and symptoms of bleeding such as blood in your spit, if your stools turn black, tarry, or bloody    - If you have not urinated within 8 hours following your procedure.- If your IV site becomes painful, red, inflamed, or looks infected.

## 2024-07-15 NOTE — ANESTHESIA POSTPROCEDURE EVALUATION
Patient: Tuan Wood    Procedure Summary       Date: 07/15/24 Room / Location: [facility] OR    Anesthesia Start: 0744 Anesthesia Stop: 0837    Procedures:       EGD      COLONOSCOPY Diagnosis:       Low ferritin      Gastroesophageal reflux disease without esophagitis      Encounter for colorectal cancer screening      Family history of colon cancer    Scheduled Providers: Marlys Manley MD Responsible Provider: Ulysses Bello MD    Anesthesia Type: MAC ASA Status: 3            Anesthesia Type: MAC    Vitals Value Taken Time   /86 07/15/24 0849   Temp 36.2 °C (97.2 °F) 07/15/24 0834   Pulse 72 07/15/24 0849   Resp 18 07/15/24 0849   SpO2 98 % 07/15/24 0849       Anesthesia Post Evaluation    Patient location during evaluation: PACU  Patient participation: complete - patient participated  Level of consciousness: awake  Pain score: 2  Pain management: adequate  Multimodal analgesia pain management approach  Airway patency: patent  Two or more strategies used to mitigate risk of obstructive sleep apnea  Cardiovascular status: acceptable  Respiratory status: acceptable  Hydration status: acceptable  Postoperative Nausea and Vomiting: none    No notable events documented.

## 2024-07-15 NOTE — ANESTHESIA PREPROCEDURE EVALUATION
Patient: Tuan Wood    Procedure Information       Anesthesia Start Date/Time: 07/15/24 0744    Scheduled providers: Marlys Manley MD    Procedures:       EGD      COLONOSCOPY    Location: [facility] OR            Relevant Problems   Cardiac   (+) ASHD (arteriosclerotic heart disease)   (+) Benign hypertension   (+) Mixed hyperlipidemia      Neuro   (+) Lumbar radiculopathy      Endocrine   (+) Obesity, morbid (Multi)      Musculoskeletal   (+) Localized osteoarthritis of knees, bilateral       Clinical information reviewed:   Tobacco  Allergies  Meds   Med Hx  Surg Hx   Fam Hx  Soc Hx        NPO Detail:  NPO/Void Status  Date of Last Liquid: 07/14/24  Time of Last Liquid: 1700  Last Intake Type: Clear fluids         Physical Exam    Airway  Mallampati: II     Cardiovascular - normal exam     Dental    Pulmonary - normal exam     Abdominal - normal exam         Anesthesia Plan    History of general anesthesia?: yes  History of complications of general anesthesia?: no    ASA 3     MAC     The patient is not a current smoker.    intravenous induction   Postoperative administration of opioids is intended.  Trial extubation is planned.  Anesthetic plan and risks discussed with patient.  Use of blood products discussed with patient who.    Plan discussed with CRNA and attending.

## 2024-07-26 LAB
LABORATORY COMMENT REPORT: NORMAL
PATH REPORT.FINAL DX SPEC: NORMAL
PATH REPORT.GROSS SPEC: NORMAL
PATH REPORT.RELEVANT HX SPEC: NORMAL
PATH REPORT.TOTAL CANCER: NORMAL

## 2024-10-09 DIAGNOSIS — I25.10 ASHD (ARTERIOSCLEROTIC HEART DISEASE): Primary | ICD-10-CM

## 2024-10-16 ENCOUNTER — APPOINTMENT (OUTPATIENT)
Dept: CARDIOLOGY | Facility: CLINIC | Age: 69
End: 2024-10-16
Payer: MEDICARE

## 2024-10-16 RX ORDER — PRASUGREL 10 MG/1
10 TABLET, FILM COATED ORAL DAILY
Qty: 90 TABLET | Refills: 0 | Status: SHIPPED | OUTPATIENT
Start: 2024-10-16

## 2024-11-06 ENCOUNTER — OFFICE VISIT (OUTPATIENT)
Dept: CARDIOLOGY | Facility: CLINIC | Age: 69
End: 2024-11-06
Payer: MEDICARE

## 2024-11-06 VITALS
DIASTOLIC BLOOD PRESSURE: 86 MMHG | HEART RATE: 100 BPM | OXYGEN SATURATION: 96 % | BODY MASS INDEX: 37.97 KG/M2 | WEIGHT: 280 LBS | SYSTOLIC BLOOD PRESSURE: 134 MMHG

## 2024-11-06 DIAGNOSIS — I25.10 ASHD (ARTERIOSCLEROTIC HEART DISEASE): Primary | ICD-10-CM

## 2024-11-06 DIAGNOSIS — R55 NEAR SYNCOPE: ICD-10-CM

## 2024-11-06 DIAGNOSIS — E78.2 MIXED HYPERLIPIDEMIA: ICD-10-CM

## 2024-11-06 DIAGNOSIS — I10 BENIGN HYPERTENSION: ICD-10-CM

## 2024-11-06 PROCEDURE — 99214 OFFICE O/P EST MOD 30 MIN: CPT | Performed by: INTERNAL MEDICINE

## 2024-11-06 PROCEDURE — 1157F ADVNC CARE PLAN IN RCRD: CPT | Performed by: INTERNAL MEDICINE

## 2024-11-06 PROCEDURE — 3079F DIAST BP 80-89 MM HG: CPT | Performed by: INTERNAL MEDICINE

## 2024-11-06 PROCEDURE — 1159F MED LIST DOCD IN RCRD: CPT | Performed by: INTERNAL MEDICINE

## 2024-11-06 PROCEDURE — 3075F SYST BP GE 130 - 139MM HG: CPT | Performed by: INTERNAL MEDICINE

## 2024-11-06 PROCEDURE — 1036F TOBACCO NON-USER: CPT | Performed by: INTERNAL MEDICINE

## 2024-11-06 PROCEDURE — 1126F AMNT PAIN NOTED NONE PRSNT: CPT | Performed by: INTERNAL MEDICINE

## 2024-11-06 RX ORDER — AMLODIPINE BESYLATE 10 MG/1
10 TABLET ORAL DAILY
Qty: 90 TABLET | Refills: 3 | Status: SHIPPED | OUTPATIENT
Start: 2024-11-06 | End: 2025-11-06

## 2024-11-06 ASSESSMENT — ENCOUNTER SYMPTOMS
PALPITATIONS: 0
ABDOMINAL PAIN: 0
COUGH: 0
HEMATURIA: 0
NUMBNESS: 0
LOSS OF SENSATION IN FEET: 0
PARESTHESIAS: 0
DYSURIA: 0
SHORTNESS OF BREATH: 0
DYSPNEA ON EXERTION: 0
BLURRED VISION: 0
OCCASIONAL FEELINGS OF UNSTEADINESS: 0
DEPRESSION: 1

## 2024-11-06 ASSESSMENT — PATIENT HEALTH QUESTIONNAIRE - PHQ9
SUM OF ALL RESPONSES TO PHQ9 QUESTIONS 1 AND 2: 0
2. FEELING DOWN, DEPRESSED OR HOPELESS: NOT AT ALL
1. LITTLE INTEREST OR PLEASURE IN DOING THINGS: NOT AT ALL

## 2024-11-06 ASSESSMENT — PAIN SCALES - GENERAL: PAINLEVEL_OUTOF10: 0-NO PAIN

## 2024-11-06 NOTE — PROGRESS NOTES
Subjective   Tuan Wood is a 68 y.o. male.    Chief Complaint:  6 month f/u    HPI  Overall feels well with no chest pain or anginal symptoms.  Stays physically active.    Review of Systems   Constitutional: Negative for malaise/fatigue.   HENT:  Negative for congestion.    Eyes:  Negative for blurred vision.   Cardiovascular:  Negative for chest pain, dyspnea on exertion and palpitations.   Respiratory:  Negative for cough and shortness of breath.    Musculoskeletal:  Negative for joint pain.   Gastrointestinal:  Negative for abdominal pain.   Genitourinary:  Negative for dysuria and hematuria.   Neurological:  Negative for numbness and paresthesias.       Objective   Constitutional:       Appearance: Not in distress.   Eyes:      Conjunctiva/sclera: Conjunctivae normal.   Neck:      Vascular: JVD normal.   Pulmonary:      Breath sounds: Normal breath sounds. No wheezing. No rhonchi. No rales.   Cardiovascular:      Normal rate. Regular rhythm.      Murmurs: There is no murmur.      No gallop.  No click. No rub.   Abdominal:      Palpations: Abdomen is soft.   Neurological:      General: No focal deficit present.      Mental Status: Alert.         Lab Review:       Assessment/Plan   The primary encounter diagnosis was ASHD (arteriosclerotic heart disease). Diagnoses of Benign hypertension, Mixed hyperlipidemia, and Near syncope were also pertinent to this visit.    Near syncope  On last visit patient reported symptoms of near syncope with characteristics suggestive of vasovagal event.  Plan was to monitor on a clinical basis.  No recurrent episodes.    Mixed hyperlipidemia  LDL cholesterol 48 on fasting lipid panel drawn by Dr. Riley in December 2023.  Continue the atorvastatin 40 mg daily.  He will be seeing Dr. Riley next month and will likely have lipid panel drawn at that time.  Will review on return.    ASHD (arteriosclerotic heart disease)  Stable with no anginal type symptoms at this time.  Continue  standard risk factor modification and follow on a clinical basis.    Benign hypertension  Blood pressure is mildly elevated.  His daughter told him carvedilol would lead to memory loss and thus he stopped it.  I told patient this would be very unlikely but we will try to stay on a simpler regimen if possible.  Will increase amlodipine to 10 mg daily and follow blood pressure on a regular basis.

## 2024-11-06 NOTE — ASSESSMENT & PLAN NOTE
LDL cholesterol 48 on fasting lipid panel drawn by Dr. Riley in December 2023.  Continue the atorvastatin 40 mg daily.  He will be seeing Dr. Riley next month and will likely have lipid panel drawn at that time.  Will review on return.

## 2024-11-06 NOTE — ASSESSMENT & PLAN NOTE
On last visit patient reported symptoms of near syncope with characteristics suggestive of vasovagal event.  Plan was to monitor on a clinical basis.  No recurrent episodes.

## 2024-11-06 NOTE — ASSESSMENT & PLAN NOTE
Blood pressure is mildly elevated.  His daughter told him carvedilol would lead to memory loss and thus he stopped it.  I told patient this would be very unlikely but we will try to stay on a simpler regimen if possible.  Will increase amlodipine to 10 mg daily and follow blood pressure on a regular basis.

## 2024-11-06 NOTE — ASSESSMENT & PLAN NOTE
Stable with no anginal type symptoms at this time.  Continue standard risk factor modification and follow on a clinical basis.

## 2024-12-14 DIAGNOSIS — K21.9 GERD WITHOUT ESOPHAGITIS: ICD-10-CM

## 2024-12-15 RX ORDER — OMEPRAZOLE 20 MG/1
20 CAPSULE, DELAYED RELEASE ORAL DAILY
Qty: 90 CAPSULE | Refills: 3 | Status: SHIPPED | OUTPATIENT
Start: 2024-12-15

## 2024-12-17 PROBLEM — R55 NEAR SYNCOPE: Status: RESOLVED | Noted: 2024-04-10 | Resolved: 2024-12-17

## 2024-12-19 ENCOUNTER — TELEPHONE (OUTPATIENT)
Dept: PRIMARY CARE | Facility: CLINIC | Age: 69
End: 2024-12-19

## 2024-12-19 ENCOUNTER — OFFICE VISIT (OUTPATIENT)
Dept: PRIMARY CARE | Facility: CLINIC | Age: 69
End: 2024-12-19
Payer: MEDICARE

## 2024-12-19 ENCOUNTER — LAB (OUTPATIENT)
Dept: LAB | Facility: LAB | Age: 69
End: 2024-12-19
Payer: MEDICARE

## 2024-12-19 VITALS
DIASTOLIC BLOOD PRESSURE: 86 MMHG | WEIGHT: 278.4 LBS | HEART RATE: 94 BPM | TEMPERATURE: 98.2 F | BODY MASS INDEX: 37.76 KG/M2 | OXYGEN SATURATION: 96 % | SYSTOLIC BLOOD PRESSURE: 138 MMHG

## 2024-12-19 DIAGNOSIS — E78.2 MIXED HYPERLIPIDEMIA: ICD-10-CM

## 2024-12-19 DIAGNOSIS — Z00.00 ROUTINE GENERAL MEDICAL EXAMINATION AT HEALTH CARE FACILITY: Primary | ICD-10-CM

## 2024-12-19 DIAGNOSIS — I25.10 ASHD (ARTERIOSCLEROTIC HEART DISEASE): ICD-10-CM

## 2024-12-19 DIAGNOSIS — E88.819 INSULIN RESISTANCE: ICD-10-CM

## 2024-12-19 DIAGNOSIS — Z00.00 ROUTINE GENERAL MEDICAL EXAMINATION AT HEALTH CARE FACILITY: ICD-10-CM

## 2024-12-19 DIAGNOSIS — Z12.5 SCREENING FOR PROSTATE CANCER: ICD-10-CM

## 2024-12-19 DIAGNOSIS — L29.9 GENERALIZED PRURITUS: ICD-10-CM

## 2024-12-19 DIAGNOSIS — R79.0 LOW FERRITIN: ICD-10-CM

## 2024-12-19 DIAGNOSIS — E66.9 OBESITY (BMI 30-39.9): ICD-10-CM

## 2024-12-19 DIAGNOSIS — R73.09 ELEVATED GLUCOSE: ICD-10-CM

## 2024-12-19 LAB
BASOPHILS # BLD AUTO: 0.17 X10*3/UL (ref 0–0.1)
BASOPHILS NFR BLD AUTO: 1.8 %
EOSINOPHIL # BLD AUTO: 0.41 X10*3/UL (ref 0–0.7)
EOSINOPHIL NFR BLD AUTO: 4.3 %
ERYTHROCYTE [DISTWIDTH] IN BLOOD BY AUTOMATED COUNT: 22.5 % (ref 11.5–14.5)
EST. AVERAGE GLUCOSE BLD GHB EST-MCNC: 114 MG/DL
HBA1C MFR BLD: 5.6 %
HCT VFR BLD AUTO: 60.2 % (ref 41–52)
HGB BLD-MCNC: 17 G/DL (ref 13.5–17.5)
IMM GRANULOCYTES # BLD AUTO: 0.05 X10*3/UL (ref 0–0.7)
IMM GRANULOCYTES NFR BLD AUTO: 0.5 % (ref 0–0.9)
LYMPHOCYTES # BLD AUTO: 1.21 X10*3/UL (ref 1.2–4.8)
LYMPHOCYTES NFR BLD AUTO: 12.8 %
MCH RBC QN AUTO: 20.2 PG (ref 26–34)
MCHC RBC AUTO-ENTMCNC: 28.2 G/DL (ref 32–36)
MCV RBC AUTO: 71 FL (ref 80–100)
MONOCYTES # BLD AUTO: 0.58 X10*3/UL (ref 0.1–1)
MONOCYTES NFR BLD AUTO: 6.1 %
NEUTROPHILS # BLD AUTO: 7.07 X10*3/UL (ref 1.2–7.7)
NEUTROPHILS NFR BLD AUTO: 74.5 %
NRBC BLD-RTO: 0 /100 WBCS (ref 0–0)
OVALOCYTES BLD QL SMEAR: NORMAL
PLATELET # BLD AUTO: 455 X10*3/UL (ref 150–450)
POLYCHROMASIA BLD QL SMEAR: NORMAL
PSA SERPL-MCNC: 1.61 NG/ML
RBC # BLD AUTO: >8 X10*6/UL (ref 4.5–5.9)
RBC MORPH BLD: NORMAL
T4 FREE SERPL-MCNC: 0.83 NG/DL (ref 0.61–1.12)
TSH SERPL-ACNC: 4.92 MIU/L (ref 0.44–3.98)
WBC # BLD AUTO: 9.5 X10*3/UL (ref 4.4–11.3)

## 2024-12-19 PROCEDURE — 85025 COMPLETE CBC W/AUTO DIFF WBC: CPT

## 2024-12-19 PROCEDURE — G0439 PPPS, SUBSEQ VISIT: HCPCS | Performed by: FAMILY MEDICINE

## 2024-12-19 PROCEDURE — G0103 PSA SCREENING: HCPCS

## 2024-12-19 PROCEDURE — 84439 ASSAY OF FREE THYROXINE: CPT

## 2024-12-19 PROCEDURE — 1157F ADVNC CARE PLAN IN RCRD: CPT | Performed by: FAMILY MEDICINE

## 2024-12-19 PROCEDURE — 1160F RVW MEDS BY RX/DR IN RCRD: CPT | Performed by: FAMILY MEDICINE

## 2024-12-19 PROCEDURE — 3075F SYST BP GE 130 - 139MM HG: CPT | Performed by: FAMILY MEDICINE

## 2024-12-19 PROCEDURE — 1170F FXNL STATUS ASSESSED: CPT | Performed by: FAMILY MEDICINE

## 2024-12-19 PROCEDURE — 99214 OFFICE O/P EST MOD 30 MIN: CPT | Performed by: FAMILY MEDICINE

## 2024-12-19 PROCEDURE — 1036F TOBACCO NON-USER: CPT | Performed by: FAMILY MEDICINE

## 2024-12-19 PROCEDURE — 3079F DIAST BP 80-89 MM HG: CPT | Performed by: FAMILY MEDICINE

## 2024-12-19 PROCEDURE — 83036 HEMOGLOBIN GLYCOSYLATED A1C: CPT

## 2024-12-19 PROCEDURE — 1126F AMNT PAIN NOTED NONE PRSNT: CPT | Performed by: FAMILY MEDICINE

## 2024-12-19 PROCEDURE — 1158F ADVNC CARE PLAN TLK DOCD: CPT | Performed by: FAMILY MEDICINE

## 2024-12-19 PROCEDURE — 36415 COLL VENOUS BLD VENIPUNCTURE: CPT

## 2024-12-19 PROCEDURE — 84443 ASSAY THYROID STIM HORMONE: CPT

## 2024-12-19 PROCEDURE — 1123F ACP DISCUSS/DSCN MKR DOCD: CPT | Performed by: FAMILY MEDICINE

## 2024-12-19 PROCEDURE — 1159F MED LIST DOCD IN RCRD: CPT | Performed by: FAMILY MEDICINE

## 2024-12-19 PROCEDURE — 99215 OFFICE O/P EST HI 40 MIN: CPT | Mod: 25 | Performed by: FAMILY MEDICINE

## 2024-12-19 RX ORDER — MIRTAZAPINE 15 MG/1
15 TABLET, FILM COATED ORAL NIGHTLY
Qty: 30 TABLET | Refills: 2 | Status: SHIPPED | OUTPATIENT
Start: 2024-12-19 | End: 2025-03-19

## 2024-12-19 ASSESSMENT — LIFESTYLE VARIABLES
HOW OFTEN DO YOU HAVE A DRINK CONTAINING ALCOHOL: MONTHLY OR LESS
HOW OFTEN DO YOU HAVE SIX OR MORE DRINKS ON ONE OCCASION: LESS THAN MONTHLY
HOW MANY STANDARD DRINKS CONTAINING ALCOHOL DO YOU HAVE ON A TYPICAL DAY: 3 OR 4
SKIP TO QUESTIONS 9-10: 0
AUDIT-C TOTAL SCORE: 3

## 2024-12-19 ASSESSMENT — ACTIVITIES OF DAILY LIVING (ADL)
TAKING_MEDICATION: INDEPENDENT
DRESSING: INDEPENDENT
MANAGING_FINANCES: INDEPENDENT
BATHING: INDEPENDENT
DOING_HOUSEWORK: INDEPENDENT
GROCERY_SHOPPING: INDEPENDENT

## 2024-12-19 ASSESSMENT — ENCOUNTER SYMPTOMS
OCCASIONAL FEELINGS OF UNSTEADINESS: 0
DEPRESSION: 0
LOSS OF SENSATION IN FEET: 0

## 2024-12-19 ASSESSMENT — PATIENT HEALTH QUESTIONNAIRE - PHQ9
2. FEELING DOWN, DEPRESSED OR HOPELESS: NOT AT ALL
SUM OF ALL RESPONSES TO PHQ9 QUESTIONS 1 AND 2: 0
1. LITTLE INTEREST OR PLEASURE IN DOING THINGS: NOT AT ALL

## 2024-12-19 ASSESSMENT — PAIN SCALES - GENERAL: PAINLEVEL_OUTOF10: 0-NO PAIN

## 2024-12-19 NOTE — TELEPHONE ENCOUNTER
Tuan  main Lab ([phone]) is calling to cancel test CMP, Feratin, Iron and Lipid, not enough serum.

## 2024-12-19 NOTE — PROGRESS NOTES
Subjective   Reason for Visit: Tuan Wood is an 69 y.o. male here for a Medicare Wellness visit.     Past Medical, Surgical, and Family History reviewed and updated in chart.    Reviewed all medications by prescribing practitioner or clinical pharmacist (such as prescriptions, OTCs, herbal therapies and supplements) and documented in the medical record.    Here for medicare physical.      Weight gain  -Pt would like to lose weight.  Walking more.  Patient has been feeling down.  Pt is trying to watch diet - not helping.  Pt would like contrave.      Hypertension  -Patient is here for follow-up of elevated blood pressure.  On amlodipine - no issues.    -Blood pressure stable.    -Cardiac symptoms: none.   -Patient denies chest pain, dyspnea, and irregular heart beat.    -Cardiologist: Dr. Davalos    Iron deficiency  -Patient has been iron deficient - seeing Diya Valdez.  Pt is on iron.  Still itching all over.          Patient Care Team:  Pa Riley DO as PCP - General (Family Medicine)  MD Chidi Price DO as Consulting Physician (Cardiology)  Diya Valdez PA-C as Physician Assistant (Hematology and Oncology)     Review of Systems    Objective   Vitals:  BP (!) 142/96 (BP Location: Right arm, Patient Position: Sitting)   Pulse 94   Temp 36.8 °C (98.2 °F) (Temporal)   Wt 126 kg (278 lb 6.4 oz)   SpO2 96%   BMI 37.76 kg/m²       Physical Exam  Vitals reviewed.   Constitutional:       General: He is not in acute distress.     Appearance: He is obese.   Cardiovascular:      Rate and Rhythm: Normal rate and regular rhythm.   Pulmonary:      Effort: Pulmonary effort is normal.      Breath sounds: No wheezing or rhonchi.   Musculoskeletal:      Right lower leg: No edema.      Left lower leg: No edema.   Lymphadenopathy:      Cervical: No cervical adenopathy.   Neurological:      Mental Status: He is alert.         Assessment & Plan  Routine general medical examination at [facility]  facility    Orders:    CBC and Auto Differential; Future    Comprehensive Metabolic Panel; Future    Lipid Panel; Future    ASHD (arteriosclerotic heart disease)    Orders:    CBC and Auto Differential; Future    Comprehensive Metabolic Panel; Future    Lipid Panel; Future    Mixed hyperlipidemia    Orders:    CBC and Auto Differential; Future    Comprehensive Metabolic Panel; Future    Lipid Panel; Future    TSH with reflex to Free T4 if abnormal; Future    Insulin resistance    Orders:    Hemoglobin A1C; Future    Obesity (BMI 30-39.9)    Orders:    naltrexone-bupropion (Contrave) 8-90 mg ER tablet; Take 2 tablets by mouth 2 times a day.    Low ferritin    Orders:    Ferritin; Future    Iron and TIBC; Future    Generalized pruritus    Orders:    mirtazapine (Remeron) 15 mg tablet; Take 1 tablet (15 mg) by mouth once daily at bedtime.       Patient Instructions   Here for medicare physical.  Order for routine blood work.  Up to date on colon cancer screening.  Check PSA for prostate cancer in blood work    For blood pressure - well controlled on amlodipine    For iron deficiency - pt has been on iron.  We will check levels.  If still low we will refer back to hematology for possible iron IV infusions    For itching - could be due to iron deficiency or neurogenic.  Recommend trial of remeron at night to help with sleep and itching.      For weight loss - we will try contrave.  If not covered, we will change to wellbutrin twice a day for weight loss.      Follow up in 6 months.  We will call in 2 weeks with update.

## 2024-12-19 NOTE — ASSESSMENT & PLAN NOTE
Orders:    CBC and Auto Differential; Future    Comprehensive Metabolic Panel; Future    Lipid Panel; Future    TSH with reflex to Free T4 if abnormal; Future

## 2024-12-19 NOTE — PATIENT INSTRUCTIONS
Here for medicare physical.  Order for routine blood work.  Up to date on colon cancer screening.  Check PSA for prostate cancer in blood work    For blood pressure - well controlled on amlodipine    For iron deficiency - pt has been on iron.  We will check levels.  If still low we will refer back to hematology for possible iron IV infusions    For itching - could be due to iron deficiency or neurogenic.  Recommend trial of remeron at night to help with sleep and itching.      For weight loss - we will try contrave.  If not covered, we will change to wellbutrin twice a day for weight loss.      Follow up in 6 months.  We will call in 2 weeks with update.

## 2024-12-20 ENCOUNTER — LAB (OUTPATIENT)
Dept: LAB | Facility: LAB | Age: 69
End: 2024-12-20
Payer: MEDICARE

## 2024-12-20 DIAGNOSIS — E78.2 MIXED HYPERLIPIDEMIA: ICD-10-CM

## 2024-12-20 DIAGNOSIS — I25.10 ATHEROSCLEROTIC HEART DISEASE OF NATIVE CORONARY ARTERY WITHOUT ANGINA PECTORIS: ICD-10-CM

## 2024-12-20 DIAGNOSIS — R79.0 ABNORMAL LEVEL OF BLOOD MINERAL: Primary | ICD-10-CM

## 2024-12-20 LAB
ALBUMIN SERPL BCP-MCNC: 4.6 G/DL (ref 3.4–5)
ALP SERPL-CCNC: 81 U/L (ref 33–136)
ALT SERPL W P-5'-P-CCNC: 45 U/L (ref 10–52)
ANION GAP SERPL CALC-SCNC: 12 MMOL/L (ref 10–20)
AST SERPL W P-5'-P-CCNC: 35 U/L (ref 9–39)
BILIRUB SERPL-MCNC: 1.4 MG/DL (ref 0–1.2)
BUN SERPL-MCNC: 13 MG/DL (ref 6–23)
CALCIUM SERPL-MCNC: 10.1 MG/DL (ref 8.6–10.3)
CHLORIDE SERPL-SCNC: 101 MMOL/L (ref 98–107)
CHOLEST SERPL-MCNC: 139 MG/DL (ref 0–199)
CHOLESTEROL/HDL RATIO: 4.4
CO2 SERPL-SCNC: 24 MMOL/L (ref 21–32)
CREAT SERPL-MCNC: 1.09 MG/DL (ref 0.5–1.3)
EGFRCR SERPLBLD CKD-EPI 2021: 74 ML/MIN/1.73M*2
FERRITIN SERPL-MCNC: 28 NG/ML (ref 20–300)
GLUCOSE SERPL-MCNC: 104 MG/DL (ref 74–99)
HDLC SERPL-MCNC: 31.4 MG/DL
IRON SATN MFR SERPL: 7 % (ref 25–45)
IRON SERPL-MCNC: 30 UG/DL (ref 35–150)
LDLC SERPL CALC-MCNC: 89 MG/DL
NON HDL CHOLESTEROL: 108 MG/DL (ref 0–149)
POTASSIUM SERPL-SCNC: 4.3 MMOL/L (ref 3.5–5.3)
PROT SERPL-MCNC: 7.6 G/DL (ref 6.4–8.2)
SODIUM SERPL-SCNC: 133 MMOL/L (ref 136–145)
TIBC SERPL-MCNC: 439 UG/DL (ref 240–445)
TRIGL SERPL-MCNC: 93 MG/DL (ref 0–149)
UIBC SERPL-MCNC: 409 UG/DL (ref 110–370)
VLDL: 19 MG/DL (ref 0–40)

## 2024-12-20 PROCEDURE — 80053 COMPREHEN METABOLIC PANEL: CPT

## 2024-12-20 PROCEDURE — 83540 ASSAY OF IRON: CPT

## 2024-12-20 PROCEDURE — 80061 LIPID PANEL: CPT

## 2024-12-20 PROCEDURE — 82728 ASSAY OF FERRITIN: CPT

## 2024-12-20 PROCEDURE — 83550 IRON BINDING TEST: CPT

## 2024-12-20 PROCEDURE — 36415 COLL VENOUS BLD VENIPUNCTURE: CPT

## 2024-12-23 ENCOUNTER — TELEPHONE (OUTPATIENT)
Dept: PRIMARY CARE | Facility: CLINIC | Age: 69
End: 2024-12-23
Payer: MEDICARE

## 2024-12-23 DIAGNOSIS — E66.9 OBESITY (BMI 30-39.9): Primary | ICD-10-CM

## 2024-12-23 PROBLEM — E66.01 OBESITY, MORBID (MULTI): Status: RESOLVED | Noted: 2023-12-18 | Resolved: 2024-12-23

## 2024-12-23 RX ORDER — BUPROPION HYDROCHLORIDE 150 MG/1
150 TABLET, EXTENDED RELEASE ORAL 2 TIMES DAILY
Qty: 60 TABLET | Refills: 1 | Status: SHIPPED | OUTPATIENT
Start: 2024-12-23 | End: 2025-02-21

## 2024-12-23 NOTE — TELEPHONE ENCOUNTER
Please call daughter and inform her - contrave is not covered through insurance.  WE can use a specialty pharmacy - $100/month this way.  Other option is trial of wellbutrin by itself - would be cheaper (wellbutrin is part of contrave).  I discussed this option with patient in case contrave was not covered - let me know what they want to do.

## 2025-01-17 DIAGNOSIS — I25.10 ASHD (ARTERIOSCLEROTIC HEART DISEASE): ICD-10-CM

## 2025-01-21 RX ORDER — PRASUGREL 10 MG/1
10 TABLET, FILM COATED ORAL DAILY
Qty: 90 TABLET | Refills: 3 | Status: SHIPPED | OUTPATIENT
Start: 2025-01-21

## 2025-04-23 ENCOUNTER — APPOINTMENT (OUTPATIENT)
Dept: HEMATOLOGY/ONCOLOGY | Facility: CLINIC | Age: 70
End: 2025-04-23
Payer: MEDICARE

## 2025-04-23 DIAGNOSIS — B27.90 EPSTEIN BARR VIRUS INFECTION: Primary | ICD-10-CM

## 2025-04-23 DIAGNOSIS — R71.8 MICROCYTOSIS: ICD-10-CM

## 2025-04-28 ENCOUNTER — APPOINTMENT (OUTPATIENT)
Dept: HEMATOLOGY/ONCOLOGY | Facility: CLINIC | Age: 70
End: 2025-04-28
Payer: MEDICARE

## 2025-04-29 ENCOUNTER — OFFICE VISIT (OUTPATIENT)
Dept: HEMATOLOGY/ONCOLOGY | Facility: CLINIC | Age: 70
End: 2025-04-29
Payer: MEDICARE

## 2025-04-29 ENCOUNTER — LAB (OUTPATIENT)
Dept: LAB | Facility: CLINIC | Age: 70
End: 2025-04-29
Payer: MEDICARE

## 2025-04-29 VITALS
TEMPERATURE: 97.5 F | HEART RATE: 82 BPM | OXYGEN SATURATION: 95 % | DIASTOLIC BLOOD PRESSURE: 88 MMHG | RESPIRATION RATE: 19 BRPM | WEIGHT: 271.94 LBS | SYSTOLIC BLOOD PRESSURE: 144 MMHG | BODY MASS INDEX: 36.88 KG/M2

## 2025-04-29 DIAGNOSIS — D75.1 POLYCYTHEMIA: ICD-10-CM

## 2025-04-29 DIAGNOSIS — R53.0 NEOPLASTIC MALIGNANT RELATED FATIGUE: ICD-10-CM

## 2025-04-29 DIAGNOSIS — R71.8 MICROCYTOSIS: ICD-10-CM

## 2025-04-29 DIAGNOSIS — D75.1 POLYCYTHEMIA: Primary | ICD-10-CM

## 2025-04-29 DIAGNOSIS — B27.90 EPSTEIN BARR VIRUS INFECTION: ICD-10-CM

## 2025-04-29 LAB
ALBUMIN SERPL BCP-MCNC: 4.4 G/DL (ref 3.4–5)
ALP SERPL-CCNC: 72 U/L (ref 33–136)
ALT SERPL W P-5'-P-CCNC: 49 U/L (ref 10–52)
ANION GAP SERPL CALC-SCNC: 13 MMOL/L (ref 10–20)
AST SERPL W P-5'-P-CCNC: 34 U/L (ref 9–39)
BASOPHILS # BLD AUTO: 0.19 X10*3/UL (ref 0–0.1)
BASOPHILS NFR BLD AUTO: 1.7 %
BILIRUB SERPL-MCNC: 0.9 MG/DL (ref 0–1.2)
BUN SERPL-MCNC: 13 MG/DL (ref 6–23)
CALCIUM SERPL-MCNC: 10.1 MG/DL (ref 8.6–10.3)
CHLORIDE SERPL-SCNC: 107 MMOL/L (ref 98–107)
CO2 SERPL-SCNC: 22 MMOL/L (ref 21–32)
CREAT SERPL-MCNC: 0.93 MG/DL (ref 0.5–1.3)
DACRYOCYTES BLD QL SMEAR: NORMAL
EGFRCR SERPLBLD CKD-EPI 2021: 90 ML/MIN/1.73M*2
EOSINOPHIL # BLD AUTO: 0.54 X10*3/UL (ref 0–0.7)
EOSINOPHIL NFR BLD AUTO: 4.7 %
ERYTHROCYTE [DISTWIDTH] IN BLOOD BY AUTOMATED COUNT: 22.8 % (ref 11.5–14.5)
FERRITIN SERPL-MCNC: 35 NG/ML (ref 20–300)
FOLATE SERPL-MCNC: 10.2 NG/ML
GIANT PLATELETS BLD QL SMEAR: NORMAL
GLUCOSE SERPL-MCNC: 118 MG/DL (ref 74–99)
HCT VFR BLD AUTO: 58.3 % (ref 41–52)
HGB BLD-MCNC: 17.1 G/DL (ref 13.5–17.5)
HGB RETIC QN: 21 PG (ref 28–38)
HIV 1+2 AB+HIV1 P24 AG SERPL QL IA: NONREACTIVE
HYPOCHROMIA BLD QL SMEAR: NORMAL
IMM GRANULOCYTES # BLD AUTO: 0.06 X10*3/UL (ref 0–0.7)
IMM GRANULOCYTES NFR BLD AUTO: 0.5 % (ref 0–0.9)
IMMATURE RETIC FRACTION: 22.1 %
IRON SATN MFR SERPL: 7 % (ref 25–45)
IRON SERPL-MCNC: 30 UG/DL (ref 35–150)
LDH SERPL L TO P-CCNC: 242 U/L (ref 84–246)
LYMPHOCYTES # BLD AUTO: 1.42 X10*3/UL (ref 1.2–4.8)
LYMPHOCYTES NFR BLD AUTO: 12.4 %
MCH RBC QN AUTO: 20.6 PG (ref 26–34)
MCHC RBC AUTO-ENTMCNC: 29.3 G/DL (ref 32–36)
MCV RBC AUTO: 70 FL (ref 80–100)
MONOCYTES # BLD AUTO: 0.57 X10*3/UL (ref 0.1–1)
MONOCYTES NFR BLD AUTO: 5 %
NEUTROPHILS # BLD AUTO: 8.68 X10*3/UL (ref 1.2–7.7)
NEUTROPHILS NFR BLD AUTO: 75.7 %
NRBC BLD-RTO: 0 /100 WBCS (ref 0–0)
PLATELET # BLD AUTO: 469 X10*3/UL (ref 150–450)
POLYCHROMASIA BLD QL SMEAR: NORMAL
POTASSIUM SERPL-SCNC: 4 MMOL/L (ref 3.5–5.3)
PROT SERPL-MCNC: 7.3 G/DL (ref 6.4–8.2)
RBC # BLD AUTO: >8 X10*6/UL (ref 4.5–5.9)
RBC MORPH BLD: NORMAL
RETICS #: 0.12 X10*6/UL (ref 0.02–0.11)
RETICS/RBC NFR AUTO: 1.4 % (ref 0.5–2)
SODIUM SERPL-SCNC: 138 MMOL/L (ref 136–145)
STOMATOCYTES BLD QL SMEAR: NORMAL
TARGETS BLD QL SMEAR: NORMAL
TIBC SERPL-MCNC: 427 UG/DL (ref 240–445)
TSH SERPL-ACNC: 3.19 MIU/L (ref 0.44–3.98)
UIBC SERPL-MCNC: 397 UG/DL (ref 110–370)
VIT B12 SERPL-MCNC: 383 PG/ML (ref 211–911)
WBC # BLD AUTO: 11.5 X10*3/UL (ref 4.4–11.3)

## 2025-04-29 PROCEDURE — 36415 COLL VENOUS BLD VENIPUNCTURE: CPT

## 2025-04-29 PROCEDURE — 1157F ADVNC CARE PLAN IN RCRD: CPT | Performed by: NURSE PRACTITIONER

## 2025-04-29 PROCEDURE — 85025 COMPLETE CBC W/AUTO DIFF WBC: CPT

## 2025-04-29 PROCEDURE — 82525 ASSAY OF COPPER: CPT | Performed by: NURSE PRACTITIONER

## 2025-04-29 PROCEDURE — 83540 ASSAY OF IRON: CPT

## 2025-04-29 PROCEDURE — 87389 HIV-1 AG W/HIV-1&-2 AB AG IA: CPT | Performed by: NURSE PRACTITIONER

## 2025-04-29 PROCEDURE — 99215 OFFICE O/P EST HI 40 MIN: CPT | Performed by: NURSE PRACTITIONER

## 2025-04-29 PROCEDURE — 3079F DIAST BP 80-89 MM HG: CPT | Performed by: NURSE PRACTITIONER

## 2025-04-29 PROCEDURE — 1125F AMNT PAIN NOTED PAIN PRSNT: CPT | Performed by: NURSE PRACTITIONER

## 2025-04-29 PROCEDURE — 82668 ASSAY OF ERYTHROPOIETIN: CPT | Performed by: NURSE PRACTITIONER

## 2025-04-29 PROCEDURE — 82746 ASSAY OF FOLIC ACID SERUM: CPT

## 2025-04-29 PROCEDURE — 85045 AUTOMATED RETICULOCYTE COUNT: CPT | Performed by: NURSE PRACTITIONER

## 2025-04-29 PROCEDURE — 84443 ASSAY THYROID STIM HORMONE: CPT | Performed by: NURSE PRACTITIONER

## 2025-04-29 PROCEDURE — 82607 VITAMIN B-12: CPT

## 2025-04-29 PROCEDURE — 83615 LACTATE (LD) (LDH) ENZYME: CPT | Performed by: NURSE PRACTITIONER

## 2025-04-29 PROCEDURE — 82728 ASSAY OF FERRITIN: CPT

## 2025-04-29 PROCEDURE — 80053 COMPREHEN METABOLIC PANEL: CPT

## 2025-04-29 PROCEDURE — 3077F SYST BP >= 140 MM HG: CPT | Performed by: NURSE PRACTITIONER

## 2025-04-29 RX ORDER — HYDROXYZINE PAMOATE 100 MG/1
100 CAPSULE ORAL 3 TIMES DAILY PRN
Qty: 30 CAPSULE | Refills: 3 | Status: SHIPPED | OUTPATIENT
Start: 2025-04-29 | End: 2026-04-29

## 2025-04-29 ASSESSMENT — ENCOUNTER SYMPTOMS
ABDOMINAL DISTENTION: 0
EXTREMITY WEAKNESS: 0
LEG SWELLING: 0
CONSTIPATION: 0
BLOOD IN STOOL: 0
ARTHRALGIAS: 1
EYES NEGATIVE: 1
HEMOPTYSIS: 0
PALPITATIONS: 0
BACK PAIN: 1
NUMBNESS: 0
SHORTNESS OF BREATH: 0
COUGH: 0
BRUISES/BLEEDS EASILY: 0
FATIGUE: 1
UNEXPECTED WEIGHT CHANGE: 1
FEVER: 0
DIAPHORESIS: 1
CHEST TIGHTNESS: 0
DIZZINESS: 0
ABDOMINAL PAIN: 0
HEMATOLOGIC/LYMPHATIC NEGATIVE: 1
LIGHT-HEADEDNESS: 0
ADENOPATHY: 0
HEADACHES: 0
TROUBLE SWALLOWING: 0
DIARRHEA: 0
NAUSEA: 1

## 2025-04-29 ASSESSMENT — PAIN SCALES - GENERAL: PAINLEVEL_OUTOF10: 2

## 2025-04-29 NOTE — PROGRESS NOTES
"Patient ID: Tuan Wood is a 69 y.o. male.  Referring Physician: Diya Valdez, ARACELY  [address]  [place] [address]  Primary Care Provider: Pa Riley DO  Visit Type: Follow Up      Subjective    HPI  66 yo with normal blood work prior to back surgery in September 2022 initially presented for evaluation of labs 12/21/22 WBC 12, hgb 15.7 and plt 513, previously 10/1/22 WBC 16.3, hgb 14 and plt 455.  He reviews that in early December 2022 he has felt terrible with fevers, night sweats, bone aching, myalgias and muscle spasms and productive cough.   At previous visit he reported symptoms  resolved other than ongoing cough which is occasionally productive and has continued now with mucopurulent sputum and cough that keeps him up at night, but today reports the cough is worse than ever and symptoms are recurring.  This cough is now improved.   He no longer  has dizziness, tinnitis, blurry vision and bitemporal headaches.    He denies abdominal pain or diarrhea.  He has known pulmonary nodules which are unchanged since 2019 by CT done 12/22/22.  One LLL nodule 0.7cm unchanged from 5/19/22.   CT 8/15/23 showed nodular densities right figgural lymph node.   We had previously offered biopsy, but he declined.  He does not have back pain, neuropathy weakness of unilateral lower extremity.  He has a history of multiple MI since 2005 including CABG X 3 and \"a stent ripped\" May 2021.  He is unaware of SOFIA 2 mutation done 12/16/19 or why that might have been tested for, but confirmed SOFIA 2 +      The fevers and achiness are resolved, and  he no longer  has the problematic cough.  His work up was negative for leukemia and lymphoma panels.  It was only pertinent for recent EBV viral infection which would account for his symptoms.  He was found to have iron deficiency but doesn't tolerate oral iron, but has found a formula he can tolerate.  His platelets have normalized likely secondary to resolution of " inflammatory process with prior symptoms and problems.  He states he quit smoking 30 years ago.  ON prior visit he stated he felt  tremendously better than when seen previously     Today he states he has night sweats and feels terrible.  He describes 3-4 month history of horrible itching that deflects from his quality of life.  He confirms he is not suicidal.   Labs today WBC 11.5, hgb 17.1, hematocrit 58.3 and plt 469.   The peripheral smear has giant cells, stomatocytes, tear drop and target cells.  B12 is 383.   He has weakness and fatigue.       Review of Systems   Constitutional:  Positive for diaphoresis, fatigue and unexpected weight change. Negative for fever.   HENT:   Negative for mouth sores, nosebleeds and trouble swallowing.    Eyes: Negative.    Respiratory:  Negative for chest tightness, cough, hemoptysis and shortness of breath.    Cardiovascular:  Negative for chest pain, leg swelling and palpitations.   Gastrointestinal:  Positive for nausea. Negative for abdominal distention, abdominal pain, blood in stool, constipation and diarrhea.        Zofran helps with the nausea   Musculoskeletal:  Positive for arthralgias and back pain. Negative for gait problem.   Skin:  Positive for itching.        Itching is quite problematic     Neurological:  Negative for dizziness, extremity weakness, gait problem, headaches, light-headedness and numbness.   Hematological: Negative.  Negative for adenopathy. Does not bruise/bleed easily.      Objective   BSA: 2.5 meters squared  /88   Pulse 82   Temp 36.4 °C (97.5 °F) (Temporal)   Resp 19   Wt 123 kg (271 lb 15 oz)   SpO2 95%   BMI 36.88 kg/m²      has a past medical history of ASHD (arteriosclerotic heart disease), TEMPLETON (dyspnea on exertion), Sena Saunders infection, GERD (gastroesophageal reflux disease), HLD (hyperlipidemia), HTN (hypertension), MI (myocardial infarction) (Multi), Old myocardial infarction, Personal history of other diseases of the  circulatory system, Personal history of pulmonary embolism, and Wears dentures.   has a past surgical history that includes Other surgical history; Other surgical history; Other surgical history; Coronary artery bypass graft; Cardiac catheterization; Coronary stent placement (2019); and Spinal fusion.  Family History[1]      Tuan Wood  reports that he quit smoking about 26 years ago. His smoking use included cigarettes. He has never used smokeless tobacco.  He  reports current alcohol use.  He  reports no history of drug use.    Physical Exam  Constitutional:       General: He is not in acute distress.     Appearance: He is not ill-appearing.      Comments: He appears to be feeling better than previous, but states severe distress over the itching   Eyes:      Conjunctiva/sclera: Conjunctivae normal.   Cardiovascular:      Rate and Rhythm: Normal rate and regular rhythm.      Pulses: Normal pulses.      Heart sounds: Normal heart sounds. No murmur heard.  Pulmonary:      Effort: Pulmonary effort is normal.      Breath sounds: No wheezing, rhonchi or rales.   Abdominal:      General: There is no distension.      Palpations: Abdomen is soft. There is no mass.      Tenderness: There is no abdominal tenderness.   Musculoskeletal:         General: No swelling, tenderness or deformity.   Lymphadenopathy:      Cervical: No cervical adenopathy.   Skin:     Coloration: Skin is not jaundiced or pale.      Findings: No bruising.   Neurological:      Motor: No weakness.     WBC   Date/Time Value Ref Range Status   04/29/2025 09:02 AM 11.5 (H) 4.4 - 11.3 x10*3/uL Final   12/19/2024 09:42 AM 9.5 4.4 - 11.3 x10*3/uL Final   06/28/2024 11:47 AM 11.5 (H) 4.4 - 11.3 x10*3/uL Final     nRBC   Date Value Ref Range Status   04/29/2025 0.0 0.0 - 0.0 /100 WBCs Final   12/19/2024 0.0 0.0 - 0.0 /100 WBCs Final   06/28/2024 0.2 (H) 0.0 - 0.0 /100 WBCs Final     RBC   Date Value Ref Range Status   04/29/2025 >8.00 (H) 4.50 - 5.90  x10*6/uL Final   12/19/2024 >8.00 (H) 4.50 - 5.90 x10*6/uL Final   06/28/2024 7.72 (H) 4.50 - 5.90 x10*6/uL Final     Hemoglobin   Date Value Ref Range Status   04/29/2025 17.1 13.5 - 17.5 g/dL Final   12/19/2024 17.0 13.5 - 17.5 g/dL Final   06/28/2024 15.7 13.5 - 17.5 g/dL Final     Hematocrit   Date Value Ref Range Status   04/29/2025 58.3 (H) 41.0 - 52.0 % Final   12/19/2024 60.2 (H) 41.0 - 52.0 % Final   06/28/2024 55.6 (H) 41.0 - 52.0 % Final     MCV   Date/Time Value Ref Range Status   04/29/2025 09:02 AM 70 (L) 80 - 100 fL Final   12/19/2024 09:42 AM 71 (L) 80 - 100 fL Final   06/28/2024 11:47 AM 72 (L) 80 - 100 fL Final     MCH   Date/Time Value Ref Range Status   04/29/2025 09:02 AM 20.6 (L) 26.0 - 34.0 pg Final   12/19/2024 09:42 AM 20.2 (L) 26.0 - 34.0 pg Final   06/28/2024 11:47 AM 20.3 (L) 26.0 - 34.0 pg Final     MCHC   Date/Time Value Ref Range Status   04/29/2025 09:02 AM 29.3 (L) 32.0 - 36.0 g/dL Final   12/19/2024 09:42 AM 28.2 (L) 32.0 - 36.0 g/dL Final   06/28/2024 11:47 AM 28.2 (L) 32.0 - 36.0 g/dL Final     RDW   Date/Time Value Ref Range Status   04/29/2025 09:02 AM 22.8 (H) 11.5 - 14.5 % Final   12/19/2024 09:42 AM 22.5 (H) 11.5 - 14.5 % Final   06/28/2024 11:47 AM 22.1 (H) 11.5 - 14.5 % Final     Platelets   Date/Time Value Ref Range Status   04/29/2025 09:02  (H) 150 - 450 x10*3/uL Final   12/19/2024 09:42  (H) 150 - 450 x10*3/uL Final   06/28/2024 11:47  (H) 150 - 450 x10*3/uL Final     MPV   Date/Time Value Ref Range Status   10/30/2023 11:05 AM 9.2 7.5 - 11.5 fL Final   12/21/2022 03:38 PM 9.6 7.0 - 12.6 CU Final   12/15/2022 10:12 AM 9.8 7.0 - 12.6 CU Final     Neutrophils %   Date/Time Value Ref Range Status   04/29/2025 09:02 AM 75.7 40.0 - 80.0 % Final   12/19/2024 09:42 AM 74.5 40.0 - 80.0 % Final   06/28/2024 11:47 AM 75.6 40.0 - 80.0 % Final     Immature Granulocytes %, Automated   Date/Time Value Ref Range Status   04/29/2025 09:02 AM 0.5 0.0 - 0.9 % Final      Comment:     Immature Granulocyte Count (IG) includes promyelocytes, myelocytes and metamyelocytes but does not include bands. Percent differential counts (%) should be interpreted in the context of the absolute cell counts (cells/UL).   12/19/2024 09:42 AM 0.5 0.0 - 0.9 % Final     Comment:     Immature Granulocyte Count (IG) includes promyelocytes, myelocytes and metamyelocytes but does not include bands. Percent differential counts (%) should be interpreted in the context of the absolute cell counts (cells/UL).   06/28/2024 11:47 AM 0.3 0.0 - 0.9 % Final     Comment:     Immature Granulocyte Count (IG) includes promyelocytes, myelocytes and metamyelocytes but does not include bands. Percent differential counts (%) should be interpreted in the context of the absolute cell counts (cells/UL).     Lymphocytes %   Date/Time Value Ref Range Status   04/29/2025 09:02 AM 12.4 13.0 - 44.0 % Final   12/19/2024 09:42 AM 12.8 13.0 - 44.0 % Final   06/28/2024 11:47 AM 12.8 13.0 - 44.0 % Final     Monocytes %   Date/Time Value Ref Range Status   04/29/2025 09:02 AM 5.0 2.0 - 10.0 % Final   12/19/2024 09:42 AM 6.1 2.0 - 10.0 % Final   06/28/2024 11:47 AM 6.3 2.0 - 10.0 % Final     Eosinophils %   Date/Time Value Ref Range Status   04/29/2025 09:02 AM 4.7 0.0 - 6.0 % Final   12/19/2024 09:42 AM 4.3 0.0 - 6.0 % Final   06/28/2024 11:47 AM 3.3 0.0 - 6.0 % Final     Basophils %   Date/Time Value Ref Range Status   04/29/2025 09:02 AM 1.7 0.0 - 2.0 % Final   12/19/2024 09:42 AM 1.8 0.0 - 2.0 % Final   06/28/2024 11:47 AM 1.7 0.0 - 2.0 % Final     Neutrophils Absolute   Date/Time Value Ref Range Status   04/29/2025 09:02 AM 8.68 (H) 1.20 - 7.70 x10*3/uL Final     Comment:     Percent differential counts (%) should be interpreted in the context of the absolute cell counts (cells/uL).   12/19/2024 09:42 AM 7.07 1.20 - 7.70 x10*3/uL Final     Comment:     Percent differential counts (%) should be interpreted in the context of the  "absolute cell counts (cells/uL).   06/28/2024 11:47 AM 8.69 (H) 1.20 - 7.70 x10*3/uL Final     Comment:     Percent differential counts (%) should be interpreted in the context of the absolute cell counts (cells/uL).     Immature Granulocytes Absolute, Automated   Date/Time Value Ref Range Status   04/29/2025 09:02 AM 0.06 0.00 - 0.70 x10*3/uL Final   12/19/2024 09:42 AM 0.05 0.00 - 0.70 x10*3/uL Final   06/28/2024 11:47 AM 0.04 0.00 - 0.70 x10*3/uL Final     Lymphocytes Absolute   Date/Time Value Ref Range Status   04/29/2025 09:02 AM 1.42 1.20 - 4.80 x10*3/uL Final   12/19/2024 09:42 AM 1.21 1.20 - 4.80 x10*3/uL Final   06/28/2024 11:47 AM 1.47 1.20 - 4.80 x10*3/uL Final     Monocytes Absolute   Date/Time Value Ref Range Status   04/29/2025 09:02 AM 0.57 0.10 - 1.00 x10*3/uL Final   12/19/2024 09:42 AM 0.58 0.10 - 1.00 x10*3/uL Final   06/28/2024 11:47 AM 0.73 0.10 - 1.00 x10*3/uL Final     Eosinophils Absolute   Date/Time Value Ref Range Status   04/29/2025 09:02 AM 0.54 0.00 - 0.70 x10*3/uL Final   12/19/2024 09:42 AM 0.41 0.00 - 0.70 x10*3/uL Final   06/28/2024 11:47 AM 0.38 0.00 - 0.70 x10*3/uL Final     Basophils Absolute   Date/Time Value Ref Range Status   04/29/2025 09:02 AM 0.19 (H) 0.00 - 0.10 x10*3/uL Final   12/19/2024 09:42 AM 0.17 (H) 0.00 - 0.10 x10*3/uL Final     Comment:     Automated WBC differential has been confirmed by manual smear.   06/28/2024 11:47 AM 0.19 (H) 0.00 - 0.10 x10*3/uL Final       No components found for: \"PT\"  aPTT   Date/Time Value Ref Range Status   09/13/2022 08:53 AM 38 26 - 39 sec Corrected     Comment:       THE APTT IS NO LONGER USED FOR MONITORING     UNFRACTIONATED HEPARIN THERAPY.    FOR MONITORING HEPARIN THERAPY,     USE THE HEPARIN ASSAY.  Patient's HCT is greater than 55%. Coagulation testing may be affected.     05/20/2021 11:10 PM 44.8 (H) 22.0 - 32.5 SEC Final     Comment:     Performed at [address] [address]   05/20/2021 09:04 PM " 124.7 (H) 22.0 - 32.5 SEC Final     Comment:     RESULT CHECKED   VERIPHY  Performed at [facility] [id number] [place] [address] [facility] [id number]         Assessment/Plan    Sena Saunders viral acute illness would account for previously seen abnormalities of CBC and his symptoms of fevers, night sweats, myalgias and arthralgias.  He no longer has  dizziness.  The headaches, blurry visiion ad tinnitis are resolved. He  has continued fatigue which would not be abnormal for EBV viral infection.  With the elevated hematocrit and amount of itching he is describing it would appear his prior iron deficiency anemia was covering the underlying polycythemia.    The peripheral smear has ginat cells which could be seen with parvovirus, sarcoid or multiple myeloma, stomatocytes seen with viruses, teardrop cells seen with myelfibrosis or metastatic disease and target cells seen with B12 deficiency which B12 is low at 383.   His iron levels are lower with ferritan at 35, iron 30 and % sat 7.       CT 12/22/22 showed numerous fissural nodules which were unchanged, but new 0.7cm LLL nodule.  8/15/23 repeat CT scan  showed nodular densities and right fissural lymph node.  He has had two years or stability /or reduction of lung nodules.        JAK2 + does not need to be treated.  Can be monitored with routing CBC.     Cardiac disease with multiple MI, CABG X3 will follow with cardiology    Plan:    I have reviewed case with Dr Zuniga and with changes seen in labs and the itching would get ultrasound of spleen and bone marrow biopsy to evaluate further.    Rx Atarax given for itching        Diagnoses and all orders for this visit:  Polycythemia  -     Myeloid Malignancies Panel; Future  -     hydrOXYzine pamoate (Vistaril) 100 mg capsule; Take 1 capsule (100 mg) by mouth 3 times a day as needed for itching.  -     Reticulocytes; Future  -     Slide Request; Future  -     Erythropoietin; Future  -     TSH with reflex to Free T4 if abnormal; Future  -      Lactate Dehydrogenase; Future  -     HIV 1/2 Antigen/Antibody Screen with Reflex to Confirmation; Future  -     Copper, Blood; Future  -     Bone Marrow Evaluation; Future  -     Clinic Appointment Request; Future  -     Abdominal Ultrasound; Future  -     CT bone marrow biopsy and aspirations only; Future  Microcytosis  -     Clinic Appointment Request  Neoplastic malignant related fatigue  -     TSH with reflex to Free T4 if abnormal; Future           Diya Valdez PA-C                                [1]  Family History  Problem Relation Name Age of Onset   • Cancer Mother     • Colon cancer Mother     • Cancer Father     • Lung cancer Father     • Lung disease Father     • Breast cancer Sister     • Cancer Sister     • Colon cancer Sister     • Cancer Sister     • Uterine cancer Sister     • Cancer Paternal Grandfather     • Heart disease Paternal Grandfather     • Other (colorectal cancer) Paternal Grandfather     • Cancer Sibling     • Stomach cancer Sibling

## 2025-04-30 LAB — PATH REVIEW-CBC DIFFERENTIAL: NORMAL

## 2025-05-01 LAB — EPO SERPL-ACNC: 1 MU/ML (ref 4–27)

## 2025-05-02 ENCOUNTER — APPOINTMENT (OUTPATIENT)
Dept: CARDIOLOGY | Facility: CLINIC | Age: 70
End: 2025-05-02
Payer: MEDICARE

## 2025-05-02 LAB — COPPER SERPL-MCNC: 77.3 UG/DL (ref 70–140)

## 2025-05-05 NOTE — ASSESSMENT & PLAN NOTE
Patient had been on carvedilol in addition to amlodipine for treatment of blood pressure, but daughter felt that it may have been causing memory problems thus she has discontinued the carvedilol.  We increased amlodipine to 10 mg daily on last visit.  Blood pressure currently is well-controlled on this dosage of the amlodipine and no change will be made.

## 2025-05-05 NOTE — ASSESSMENT & PLAN NOTE
Patient did have a near syncopal episode and we are just monitoring on a clinical basis.  No further episodes and overall doing well at this time.  Will follow on a clinical basis.

## 2025-05-06 LAB
ELECTRONICALLY SIGNED BY: NORMAL
MYELOID NGS RESULTS: NORMAL

## 2025-05-12 ENCOUNTER — TELEPHONE (OUTPATIENT)
Dept: HEMATOLOGY/ONCOLOGY | Facility: CLINIC | Age: 70
End: 2025-05-12
Payer: MEDICARE

## 2025-05-12 DIAGNOSIS — Z15.89 JAK-2 GENE MUTATION: Primary | ICD-10-CM

## 2025-05-12 NOTE — TELEPHONE ENCOUNTER
Diya ordered an ultrasound of abdomen at last visit but it did not populate to active requests  Can ordered be fixed to schedule

## 2025-05-14 ENCOUNTER — OFFICE VISIT (OUTPATIENT)
Facility: CLINIC | Age: 70
End: 2025-05-14
Payer: MEDICARE

## 2025-05-14 VITALS
DIASTOLIC BLOOD PRESSURE: 70 MMHG | HEART RATE: 96 BPM | BODY MASS INDEX: 36.75 KG/M2 | WEIGHT: 271 LBS | SYSTOLIC BLOOD PRESSURE: 126 MMHG | OXYGEN SATURATION: 97 %

## 2025-05-14 DIAGNOSIS — R55 NEAR SYNCOPE: ICD-10-CM

## 2025-05-14 DIAGNOSIS — E78.2 MIXED HYPERLIPIDEMIA: ICD-10-CM

## 2025-05-14 DIAGNOSIS — I25.10 ASHD (ARTERIOSCLEROTIC HEART DISEASE): Primary | ICD-10-CM

## 2025-05-14 DIAGNOSIS — I10 BENIGN HYPERTENSION: ICD-10-CM

## 2025-05-14 PROCEDURE — 1036F TOBACCO NON-USER: CPT | Performed by: INTERNAL MEDICINE

## 2025-05-14 PROCEDURE — 99214 OFFICE O/P EST MOD 30 MIN: CPT | Performed by: INTERNAL MEDICINE

## 2025-05-14 PROCEDURE — 3078F DIAST BP <80 MM HG: CPT | Performed by: INTERNAL MEDICINE

## 2025-05-14 PROCEDURE — 3074F SYST BP LT 130 MM HG: CPT | Performed by: INTERNAL MEDICINE

## 2025-05-14 PROCEDURE — 1126F AMNT PAIN NOTED NONE PRSNT: CPT | Performed by: INTERNAL MEDICINE

## 2025-05-14 PROCEDURE — 1159F MED LIST DOCD IN RCRD: CPT | Performed by: INTERNAL MEDICINE

## 2025-05-14 ASSESSMENT — ENCOUNTER SYMPTOMS
ABDOMINAL PAIN: 0
SHORTNESS OF BREATH: 0
LOSS OF SENSATION IN FEET: 0
OCCASIONAL FEELINGS OF UNSTEADINESS: 0
HEMATURIA: 0
PARESTHESIAS: 0
DYSURIA: 0
NUMBNESS: 0
COUGH: 0
BLURRED VISION: 0
PALPITATIONS: 0
DEPRESSION: 1
DYSPNEA ON EXERTION: 0

## 2025-05-14 ASSESSMENT — PAIN SCALES - GENERAL: PAINLEVEL_OUTOF10: 0-NO PAIN

## 2025-05-14 ASSESSMENT — LIFESTYLE VARIABLES: TOTAL SCORE: 0

## 2025-05-14 NOTE — PROGRESS NOTES
Subjective   Tuan Wood is a 69 y.o. male.    Chief Complaint:  6 month f/u    HPI  69-year-old male with a history of coronary artery disease reports for a cardiac follow-up visit.  Patient underwent coronary artery bypass graft surgery back in 2004 with a left internal mammary artery to the LAD, vein graft to the circumflex and vein graft to the posterolateral circumflex.  He had a percutaneous coronary invention with a bare-metal stent to a diagonal branch by Dr. Sandoval in May in 2021.  This was complicated by a acute stent thrombosis and underwent a second coronary intervention with Dr. Vila where a drug-eluting stent was placed into the diagonal branch.  Cardiac wise the patient states he feels quite well.  No chest pain or anginal symptoms.  Was noted to have discrepancy on complete blood count and will be undergoing a bone marrow biopsy in the coming weeks.  Cardiac wise he states he is doing well.    Review of Systems   Constitutional: Negative for malaise/fatigue.   HENT:  Negative for congestion.    Eyes:  Negative for blurred vision.   Cardiovascular:  Negative for chest pain, dyspnea on exertion and palpitations.   Respiratory:  Negative for cough and shortness of breath.    Musculoskeletal:  Positive for arthritis and joint pain.   Gastrointestinal:  Negative for abdominal pain.   Genitourinary:  Negative for dysuria and hematuria.   Neurological:  Negative for numbness and paresthesias.       Objective   Constitutional:       Appearance: Not in distress.   Eyes:      Conjunctiva/sclera: Conjunctivae normal.   Neck:      Vascular: JVD normal.   Pulmonary:      Breath sounds: Normal breath sounds. No wheezing. No rhonchi. No rales.   Cardiovascular:      Normal rate. Regular rhythm.      Murmurs: There is no murmur.      No gallop.  No click. No rub.   Abdominal:      Palpations: Abdomen is soft.   Neurological:      General: No focal deficit present.      Mental Status: Alert.         Lab Review:        Assessment/Plan   The primary encounter diagnosis was ASHD (arteriosclerotic heart disease). Diagnoses of Benign hypertension, Mixed hyperlipidemia, and Near syncope were also pertinent to this visit.    Near syncope  Patient did have a near syncopal episode and we are just monitoring on a clinical basis.  No further episodes and overall doing well at this time.  Will follow on a clinical basis.    Benign hypertension  Patient had been on carvedilol in addition to amlodipine for treatment of blood pressure, but daughter felt that it may have been causing memory problems thus she has discontinued the carvedilol.  We increased amlodipine to 10 mg daily on last visit.  Blood pressure currently is well-controlled on this dosage of the amlodipine and no change will be made.    Mixed hyperlipidemia  Last fasting lipid panel was in December with an LDL cholesterol of 89.  I see no statin medication on his current medication list.  The patient states he thinks he is taking a statin medication.  The patient is unable to read or write and does need some aid with his medications.  I asked the patient to formulate a good medication list on paper with his daughter and bring it with him on return visit.  I am also going to put a order for a fasting lipid panel into the system and with his hematologic abnormalities he will be likely having blood test in the coming weeks.  Will review with him on return visit.      ASHD (arteriosclerotic heart disease)  Remote coronary artery bypass graft surgery in 2004 and percutaneous coronary interventions to a diagonal branch back in 2021.  Currently stable with no anginal type symptoms.  We will continue standard risk factor modification and follow on a clinical basis.

## 2025-05-14 NOTE — ASSESSMENT & PLAN NOTE
Remote coronary artery bypass graft surgery in 2004 and percutaneous coronary interventions to a diagonal branch back in 2021.  Currently stable with no anginal type symptoms.  We will continue standard risk factor modification and follow on a clinical basis.

## 2025-05-16 ENCOUNTER — HOSPITAL ENCOUNTER (OUTPATIENT)
Dept: RADIOLOGY | Facility: HOSPITAL | Age: 70
Discharge: HOME | End: 2025-05-16
Payer: MEDICARE

## 2025-05-16 DIAGNOSIS — Z15.89 JAK-2 GENE MUTATION: ICD-10-CM

## 2025-05-16 PROCEDURE — 76705 ECHO EXAM OF ABDOMEN: CPT

## 2025-05-19 ENCOUNTER — HOSPITAL ENCOUNTER (OUTPATIENT)
Dept: RADIOLOGY | Facility: HOSPITAL | Age: 70
Discharge: HOME | End: 2025-05-19
Payer: MEDICARE

## 2025-05-19 VITALS
DIASTOLIC BLOOD PRESSURE: 83 MMHG | WEIGHT: 271 LBS | OXYGEN SATURATION: 96 % | RESPIRATION RATE: 18 BRPM | TEMPERATURE: 98.2 F | BODY MASS INDEX: 36.7 KG/M2 | HEART RATE: 77 BPM | HEIGHT: 72 IN | SYSTOLIC BLOOD PRESSURE: 122 MMHG

## 2025-05-19 DIAGNOSIS — D75.1 POLYCYTHEMIA: ICD-10-CM

## 2025-05-19 LAB
BASOPHILS # BLD AUTO: 0.2 X10*3/UL (ref 0–0.1)
BASOPHILS NFR BLD AUTO: 1.6 %
EOSINOPHIL # BLD AUTO: 0.6 X10*3/UL (ref 0–0.7)
EOSINOPHIL NFR BLD AUTO: 4.9 %
ERYTHROCYTE [DISTWIDTH] IN BLOOD BY AUTOMATED COUNT: 24.2 % (ref 11.5–14.5)
HCT VFR BLD AUTO: 57.7 % (ref 41–52)
HGB BLD-MCNC: 17.3 G/DL (ref 13.5–17.5)
IMM GRANULOCYTES # BLD AUTO: 0.08 X10*3/UL (ref 0–0.7)
IMM GRANULOCYTES NFR BLD AUTO: 0.6 % (ref 0–0.9)
LYMPHOCYTES # BLD AUTO: 1.79 X10*3/UL (ref 1.2–4.8)
LYMPHOCYTES NFR BLD AUTO: 14.5 %
MCH RBC QN AUTO: 20.8 PG (ref 26–34)
MCHC RBC AUTO-ENTMCNC: 30 G/DL (ref 32–36)
MCV RBC AUTO: 69 FL (ref 80–100)
MONOCYTES # BLD AUTO: 0.68 X10*3/UL (ref 0.1–1)
MONOCYTES NFR BLD AUTO: 5.5 %
NEUTROPHILS # BLD AUTO: 8.98 X10*3/UL (ref 1.2–7.7)
NEUTROPHILS NFR BLD AUTO: 72.9 %
NRBC BLD-RTO: 0 /100 WBCS (ref 0–0)
PLATELET # BLD AUTO: 517 X10*3/UL (ref 150–450)
RBC # BLD AUTO: >8 X10*6/UL (ref 4.5–5.9)
RBC MORPH BLD: NORMAL
WBC # BLD AUTO: 12.3 X10*3/UL (ref 4.4–11.3)

## 2025-05-19 PROCEDURE — 7100000010 HC PHASE TWO TIME - EACH INCREMENTAL 1 MINUTE

## 2025-05-19 PROCEDURE — 85097 BONE MARROW INTERPRETATION: CPT | Mod: GEALAB | Performed by: NURSE PRACTITIONER

## 2025-05-19 PROCEDURE — 7100000009 HC PHASE TWO TIME - INITIAL BASE CHARGE

## 2025-05-19 PROCEDURE — 36415 COLL VENOUS BLD VENIPUNCTURE: CPT | Performed by: NURSE PRACTITIONER

## 2025-05-19 PROCEDURE — 85025 COMPLETE CBC W/AUTO DIFF WBC: CPT | Performed by: NURSE PRACTITIONER

## 2025-05-19 PROCEDURE — 88264 CHROMOSOME ANALYSIS 20-25: CPT | Performed by: NURSE PRACTITIONER

## 2025-05-19 PROCEDURE — 77012 CT SCAN FOR NEEDLE BIOPSY: CPT

## 2025-05-19 ASSESSMENT — PAIN SCALES - GENERAL
PAINLEVEL_OUTOF10: 0 - NO PAIN

## 2025-05-19 ASSESSMENT — PAIN - FUNCTIONAL ASSESSMENT
PAIN_FUNCTIONAL_ASSESSMENT: 0-10

## 2025-05-19 NOTE — DISCHARGE INSTRUCTIONS
Take it easy today.  No heavy lifting. Nothing heavier than a gallon of milk.  May NOT drive today.  Remove dressing tomorrow and you may shower tomorrow.  May take tylenol as needed for pain.  Follow up with Diya Valdez PA-C    In 10 days

## 2025-05-28 LAB
CELL COUNT (BLOOD): 13.28 X10*3/UL
CELL POPULATIONS: NORMAL
DIAGNOSIS: NORMAL
FLOW DIFFERENTIAL: NORMAL
FLOW TEST ORDERED: NORMAL
LAB TEST METHOD: NORMAL
NUMBER OF CELLS COLLECTED: NORMAL PER TUBE
PATH REPORT.COMMENTS IMP SPEC: NORMAL
PATH REPORT.FINAL DX SPEC: NORMAL
PATH REPORT.GROSS SPEC: NORMAL
PATH REPORT.MICROSCOPIC SPEC OTHER STN: NORMAL
PATH REPORT.RELEVANT HX SPEC: NORMAL
PATH REPORT.TOTAL CANCER: NORMAL
PATH REPORT.TOTAL CANCER: NORMAL
SIGNATURE COMMENT: NORMAL
SPECIMEN VIABILITY: NORMAL

## 2025-05-29 PROBLEM — D45: Status: ACTIVE | Noted: 2025-05-29

## 2025-05-29 RX ORDER — FAMOTIDINE 10 MG/ML
20 INJECTION, SOLUTION INTRAVENOUS ONCE AS NEEDED
Status: CANCELLED | OUTPATIENT
Start: 2025-06-05

## 2025-05-29 RX ORDER — HEPARIN 100 UNIT/ML
500 SYRINGE INTRAVENOUS AS NEEDED
Status: CANCELLED | OUTPATIENT
Start: 2025-06-05

## 2025-05-29 RX ORDER — DIPHENHYDRAMINE HYDROCHLORIDE 50 MG/ML
50 INJECTION, SOLUTION INTRAMUSCULAR; INTRAVENOUS AS NEEDED
Status: CANCELLED | OUTPATIENT
Start: 2025-06-05

## 2025-05-29 RX ORDER — HEPARIN SODIUM,PORCINE/PF 10 UNIT/ML
50 SYRINGE (ML) INTRAVENOUS AS NEEDED
Status: CANCELLED | OUTPATIENT
Start: 2025-06-05

## 2025-05-29 RX ORDER — ALBUTEROL SULFATE 0.83 MG/ML
3 SOLUTION RESPIRATORY (INHALATION) AS NEEDED
Status: CANCELLED | OUTPATIENT
Start: 2025-06-05

## 2025-05-29 RX ORDER — EPINEPHRINE 0.3 MG/.3ML
0.3 INJECTION SUBCUTANEOUS EVERY 5 MIN PRN
Status: CANCELLED | OUTPATIENT
Start: 2025-06-05

## 2025-06-03 ENCOUNTER — LAB (OUTPATIENT)
Dept: LAB | Facility: CLINIC | Age: 70
End: 2025-06-03
Payer: MEDICARE

## 2025-06-03 ENCOUNTER — OFFICE VISIT (OUTPATIENT)
Dept: HEMATOLOGY/ONCOLOGY | Facility: CLINIC | Age: 70
End: 2025-06-03
Payer: MEDICARE

## 2025-06-03 VITALS
WEIGHT: 265.21 LBS | BODY MASS INDEX: 35.97 KG/M2 | RESPIRATION RATE: 19 BRPM | SYSTOLIC BLOOD PRESSURE: 140 MMHG | HEART RATE: 95 BPM | DIASTOLIC BLOOD PRESSURE: 94 MMHG | OXYGEN SATURATION: 97 % | TEMPERATURE: 97.7 F

## 2025-06-03 DIAGNOSIS — D45 JAK2 POSITIVE POLYCYTHEMIA VERA (MULTI): ICD-10-CM

## 2025-06-03 DIAGNOSIS — D75.1 POLYCYTHEMIA: ICD-10-CM

## 2025-06-03 DIAGNOSIS — D75.1 POLYCYTHEMIA: Primary | ICD-10-CM

## 2025-06-03 LAB
BASOPHILS # BLD AUTO: 0.2 X10*3/UL (ref 0–0.1)
BASOPHILS NFR BLD AUTO: 1.8 %
EOSINOPHIL # BLD AUTO: 0.52 X10*3/UL (ref 0–0.7)
EOSINOPHIL NFR BLD AUTO: 4.6 %
ERYTHROCYTE [DISTWIDTH] IN BLOOD BY AUTOMATED COUNT: 24.1 % (ref 11.5–14.5)
HCT VFR BLD AUTO: 58 % (ref 41–52)
HGB BLD-MCNC: 17.4 G/DL (ref 13.5–17.5)
IMM GRANULOCYTES # BLD AUTO: 0.03 X10*3/UL (ref 0–0.7)
IMM GRANULOCYTES NFR BLD AUTO: 0.3 % (ref 0–0.9)
LDH SERPL L TO P-CCNC: 298 U/L (ref 84–246)
LYMPHOCYTES # BLD AUTO: 1.47 X10*3/UL (ref 1.2–4.8)
LYMPHOCYTES NFR BLD AUTO: 13 %
MCH RBC QN AUTO: 20.7 PG (ref 26–34)
MCHC RBC AUTO-ENTMCNC: 30 G/DL (ref 32–36)
MCV RBC AUTO: 69 FL (ref 80–100)
MONOCYTES # BLD AUTO: 0.68 X10*3/UL (ref 0.1–1)
MONOCYTES NFR BLD AUTO: 6 %
NEUTROPHILS # BLD AUTO: 8.4 X10*3/UL (ref 1.2–7.7)
NEUTROPHILS NFR BLD AUTO: 74.3 %
NRBC BLD-RTO: 0 /100 WBCS (ref 0–0)
PLATELET # BLD AUTO: 553 X10*3/UL (ref 150–450)
RBC # BLD AUTO: >8 X10*6/UL (ref 4.5–5.9)
WBC # BLD AUTO: 11.3 X10*3/UL (ref 4.4–11.3)

## 2025-06-03 PROCEDURE — 99215 OFFICE O/P EST HI 40 MIN: CPT | Performed by: STUDENT IN AN ORGANIZED HEALTH CARE EDUCATION/TRAINING PROGRAM

## 2025-06-03 PROCEDURE — 3080F DIAST BP >= 90 MM HG: CPT | Performed by: STUDENT IN AN ORGANIZED HEALTH CARE EDUCATION/TRAINING PROGRAM

## 2025-06-03 PROCEDURE — 1125F AMNT PAIN NOTED PAIN PRSNT: CPT | Performed by: STUDENT IN AN ORGANIZED HEALTH CARE EDUCATION/TRAINING PROGRAM

## 2025-06-03 PROCEDURE — 85025 COMPLETE CBC W/AUTO DIFF WBC: CPT

## 2025-06-03 PROCEDURE — 83615 LACTATE (LD) (LDH) ENZYME: CPT

## 2025-06-03 PROCEDURE — 1159F MED LIST DOCD IN RCRD: CPT | Performed by: STUDENT IN AN ORGANIZED HEALTH CARE EDUCATION/TRAINING PROGRAM

## 2025-06-03 PROCEDURE — 3077F SYST BP >= 140 MM HG: CPT | Performed by: STUDENT IN AN ORGANIZED HEALTH CARE EDUCATION/TRAINING PROGRAM

## 2025-06-03 PROCEDURE — 36415 COLL VENOUS BLD VENIPUNCTURE: CPT

## 2025-06-03 PROCEDURE — G2211 COMPLEX E/M VISIT ADD ON: HCPCS | Performed by: STUDENT IN AN ORGANIZED HEALTH CARE EDUCATION/TRAINING PROGRAM

## 2025-06-03 RX ORDER — ALBUTEROL SULFATE 0.83 MG/ML
3 SOLUTION RESPIRATORY (INHALATION) AS NEEDED
Status: CANCELLED | OUTPATIENT
Start: 2025-06-04

## 2025-06-03 RX ORDER — FAMOTIDINE 10 MG/ML
20 INJECTION, SOLUTION INTRAVENOUS ONCE AS NEEDED
Status: CANCELLED | OUTPATIENT
Start: 2025-06-04

## 2025-06-03 RX ORDER — DIPHENHYDRAMINE HYDROCHLORIDE 50 MG/ML
50 INJECTION, SOLUTION INTRAMUSCULAR; INTRAVENOUS AS NEEDED
Status: CANCELLED | OUTPATIENT
Start: 2025-06-04

## 2025-06-03 RX ORDER — HYDROXYUREA 500 MG/1
500 CAPSULE ORAL DAILY
Qty: 90 CAPSULE | Refills: 0 | Status: SHIPPED | OUTPATIENT
Start: 2025-06-03 | End: 2025-09-01

## 2025-06-03 RX ORDER — EPINEPHRINE 0.3 MG/.3ML
0.3 INJECTION SUBCUTANEOUS EVERY 5 MIN PRN
Status: CANCELLED | OUTPATIENT
Start: 2025-06-04

## 2025-06-03 ASSESSMENT — PAIN SCALES - GENERAL: PAINLEVEL_OUTOF10: 5

## 2025-06-03 NOTE — PROGRESS NOTES
Patient ID: Tuan Wood is a 69 y.o. male.  Referring Physician: Diya Valdez PA-C  [address] [place]  [place] [address]  Primary Care Provider: Pa Riley DO  Referral Reason: JAK2 V617F + PV    HPI:  The patient is 69 years old male with history of coronary artery disease status post multiple stents and status post open heart surgery, who was referred for JAK2 V6 1 7 F positive polycythemia.  Patient has completed bone marrow biopsy on May 19, 2025.  He has been found to have erythrocytosis hemoglobin running 17-18 with hematocrit 58 on recent labs also have thrombocytosis platelet 553 and mild leukocytosis WBC 11.3.  Also consistent with iron deficiency.  Patient complains of fatigue, night sweats, dizziness, poor concentration, flank pain, itchiness and red eyes.         FINAL DIAGNOSIS   A-C: BONE MARROW CLOT, CORE BIOPSY, ASPIRATE AND TOUCH IMPRINT, LEFT ILIAC CREST:      -- HYPERCELLULAR BONE MARROW (80% CELLULAR) WITH JAK2+ MYELOPROLIFERATIVE NEOPLASM  -- MATURING TRILINEAGE HEMATOPOIESIS           Past Medical History: Medical History[1]  Social History:   Social History     Socioeconomic History    Marital status:      Spouse name: Not on file    Number of children: Not on file    Years of education: Not on file    Highest education level: Not on file   Occupational History    Not on file   Tobacco Use    Smoking status: Former     Current packs/day: 0.00     Types: Cigarettes     Quit date:      Years since quittin.4    Smokeless tobacco: Never   Vaping Use    Vaping status: Never Used   Substance and Sexual Activity    Alcohol use: Yes     Comment: OCCASIONALLY    Drug use: Never    Sexual activity: Not on file   Other Topics Concern    Not on file   Social History Narrative    Not on file     Social Drivers of Health     Financial Resource Strain: Not on file   Food Insecurity: Not on file   Transportation Needs: Not on file   Physical Activity: Not on file   Stress: Not  on file   Social Connections: Not on file   Intimate Partner Violence: Not on file   Housing Stability: Not on file     Surgical History: Surgical History[2]  Family History: Family History[3]   reports that he quit smoking about 26 years ago. His smoking use included cigarettes. He has never used smokeless tobacco.  Oncology Family history: Cancer-related family history includes Breast cancer in his sister; Cancer in his father, mother, paternal grandfather, sibling, sister, and sister; Colon cancer in his mother and sister; Lung cancer in his father; Stomach cancer in his sibling; Uterine cancer in his sister.    Review Of Systems:  General: no fatigue, weight change, appetite change  Ears/Nose/Mouth/Throat: no mouth sore, no hearing loss, no nasal congestion, no sore throat  Cardiovascular: no chest pain, no shortness of breath, no palpitation  Pulmonary: no cough, no wheezing, no hemoptysis  GI: no nausea, no vomiting, no diarrhea or constipation, no bleeding per rectum  Neurological: no dizziness, no seizure, no weakness, no sensation change  Musculoskeletal: no joint swelling, no bone pain, no back pain  Skin: no skin rash, no bruising, no skin lesion      Physical Exam:  BP (!) 140/94   Pulse 95   Temp 36.5 °C (97.7 °F) (Temporal)   Resp 19   Wt 120 kg (265 lb 3.4 oz)   SpO2 97%   BMI 35.97 kg/m²   BSA: 2.47 meters squared  General: awake/alert/oriented x3, no distress  Head: atraumatic. Symmetric facial expressions  Eyes: PERRL, EOMI, clear sclera.  Ears/Nose/Mouth/Throat:  Oral mucous membranes moist. No oral ulcers  Neck: No palpable cervical chain lymph nodes  Respiratory: unlabored breathing on room air, good chest expansion, clear breath sounds on both sides, no ronchi  Cardio: Regular rate and rhythm, normal S1 and S2, radial pulses symmetric  GI: Nondistended, soft, non-tender abdomen, palpable splenomegaly 3 cm below costal margin  Musculoskeletal: Normal muscle bulk and tone, ROM intact, no  joint swelling.  Extremities: No pedal edema, no arm or leg wounds  Neuro: Alert, cognition intact, speech normal. No motor deficits noted. Sensation intact to touch and hot/cold.   Psychological: Appropriate mood and behavior.  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  Recommend to take low-dose aspirin.  And phlebotomy every 2 weeks for hematocrit control  less than 45%.  given high risks patient will need cytoreduction.  First-line treatment options discussed Hydrea versus ropeginterferon.  Due to patient have current mood disorder, recommend hydroxyurea.  We explained side effects of medication such as cytopenia, GI upset, leg ulcer.  Patient agreeable to treatment.  Will start with low-dose Hydrea 500 mg daily and count check every week.   - return to Clinic in 2 weeks    Performance Status: Symptomatic; fully ambulatory    I spent more than 60 minutes for the patient today, including face-to-face  conversation, pre-visit preparation, post-visit orders, and others.   Mary Zuniga MD                              [1]   Past Medical History:  Diagnosis Date    Arthritis     ASHD (arteriosclerotic heart disease)     TEMPLETON (dyspnea on exertion)     Sena Saunders infection     GERD (gastroesophageal reflux disease)     HLD (hyperlipidemia)     HTN (hypertension)     MI (myocardial infarction) (Multi)     PER DAUGHTER- PT MAY HAVE HAD 5    Old myocardial infarction     History of myocardial infarction    Personal history of other diseases of the circulatory system     History of hypertension    Personal history of pulmonary embolism     History of pulmonary embolism, approx 20 yrs    Wears dentures    [2]   Past Surgical History:  Procedure Laterality Date    APPENDECTOMY      at 21 years old    CARDIAC CATHETERIZATION      CORONARY ARTERY BYPASS GRAFT      CABGX1 2004    CORONARY STENT PLACEMENT  2019    X1    OTHER SURGICAL HISTORY      Heart surgery    OTHER SURGICAL HISTORY      UMBILICAL HERNIA REPAIR    OTHER SURGICAL HISTORY      Coronary artery bypass graft    SPINAL FUSION      AND DECOMPRESSION   [3]   Family History  Problem Relation Name Age of Onset    Cancer Mother      Colon cancer Mother      Cancer Father      Lung cancer Father      Lung disease Father      Breast cancer Sister      Cancer Sister      Colon cancer Sister      Cancer Sister      Uterine cancer Sister      Cancer Paternal Grandfather      Heart disease Paternal Grandfather      Other (colorectal cancer) Paternal Grandfather      Cancer Sibling      Stomach cancer Sibling     [4]   Current Outpatient Medications:     amLODIPine (Norvasc) 10 mg tablet, Take 1 tablet (10 mg) by mouth once daily., Disp: 90 tablet, Rfl: 3    hydrOXYzine pamoate (Vistaril) 100 mg capsule, Take 1 capsule (100 mg) by mouth 3 times a day as needed for itching., Disp: 30 capsule, Rfl: 3    nitroglycerin (Nitrostat) 0.4 mg SL tablet, 1 tablet (0.4 mg).  every 5 min as  needed may repeat up to 3x before seeking medical attention Sublingual as directed, Disp: , Rfl:     omeprazole (PriLOSEC) 20 mg DR capsule, TAKE 1 CAPSULE BY MOUTH ONCE DAILY.  DO NOT CRUSH OR CHEW., Disp: 90 capsule, Rfl: 3    prasugrel (Effient) 10 mg tablet, Take 1 tablet by mouth once daily, Disp: 90 tablet, Rfl: 3    buPROPion SR (Wellbutrin SR) 150 mg 12 hr tablet, Take 1 tablet (150 mg) by mouth 2 times a day. Do not crush, chew, or split., Disp: 60 tablet, Rfl: 1    hydroxyurea (Hydrea) 500 mg capsule, Take 1 capsule (500 mg total) by mouth once daily.  Take at the same time each day., Disp: 90 capsule, Rfl: 0    mirtazapine (Remeron) 15 mg tablet, Take 1 tablet (15 mg) by mouth once daily at bedtime., Disp: 30 tablet, Rfl: 2    naltrexone-bupropion (Contrave) 8-90 mg ER tablet, Take 2 tablets by mouth 2 times a day. (Patient not taking: Reported on 6/3/2025), Disp: 120 tablet, Rfl: 2

## 2025-06-03 NOTE — PROGRESS NOTES
Patient here for new patient visit with Dr. Zuniga for polycythemia  Patient here with his daughter    Medications and Allergies reviewed and reconciled this visit.    No concerns or complaints noted at this time.     Pt reports appetite is fair.  Pt reports pain to joints.   Pt denies any N/V/D.  Pt reports neuropathy to hands    New order for Hydrea & ASA.  Phlebotomy every 2 weeks(pt prefers [facility])  Labs weekly.    Follow up per  MD request in 3 weeks.    Pt. reports availability and use of mychart, Reviewed this is a good place to communicate with the team as well as review labs and upcoming orders.      No barriers to education noted, patient agrees to current plan and verbalized understanding using teach back method.

## 2025-06-05 ENCOUNTER — APPOINTMENT (OUTPATIENT)
Dept: HEMATOLOGY/ONCOLOGY | Facility: CLINIC | Age: 70
End: 2025-06-05
Payer: MEDICARE

## 2025-06-05 ENCOUNTER — INFUSION (OUTPATIENT)
Dept: HEMATOLOGY/ONCOLOGY | Facility: CLINIC | Age: 70
End: 2025-06-05
Payer: MEDICARE

## 2025-06-05 VITALS
OXYGEN SATURATION: 95 % | WEIGHT: 266.21 LBS | HEART RATE: 93 BPM | RESPIRATION RATE: 18 BRPM | TEMPERATURE: 97.7 F | BODY MASS INDEX: 37.27 KG/M2 | HEIGHT: 71 IN | DIASTOLIC BLOOD PRESSURE: 81 MMHG | SYSTOLIC BLOOD PRESSURE: 120 MMHG

## 2025-06-05 DIAGNOSIS — D45 JAK2 POSITIVE POLYCYTHEMIA VERA (MULTI): ICD-10-CM

## 2025-06-05 PROCEDURE — 99195 PHLEBOTOMY: CPT

## 2025-06-05 RX ORDER — DIPHENHYDRAMINE HYDROCHLORIDE 50 MG/ML
50 INJECTION, SOLUTION INTRAMUSCULAR; INTRAVENOUS AS NEEDED
Status: DISCONTINUED | OUTPATIENT
Start: 2025-06-05 | End: 2025-06-05 | Stop reason: HOSPADM

## 2025-06-05 RX ORDER — ASPIRIN 81 MG/1
81 TABLET ORAL DAILY
COMMUNITY

## 2025-06-05 RX ORDER — HEPARIN 100 UNIT/ML
500 SYRINGE INTRAVENOUS AS NEEDED
Status: DISCONTINUED | OUTPATIENT
Start: 2025-06-05 | End: 2025-06-05 | Stop reason: HOSPADM

## 2025-06-05 RX ORDER — EPINEPHRINE 0.3 MG/.3ML
0.3 INJECTION SUBCUTANEOUS EVERY 5 MIN PRN
OUTPATIENT
Start: 2025-06-19

## 2025-06-05 RX ORDER — HEPARIN SODIUM,PORCINE/PF 10 UNIT/ML
50 SYRINGE (ML) INTRAVENOUS AS NEEDED
Status: DISCONTINUED | OUTPATIENT
Start: 2025-06-05 | End: 2025-06-05 | Stop reason: HOSPADM

## 2025-06-05 RX ORDER — ALBUTEROL SULFATE 0.83 MG/ML
3 SOLUTION RESPIRATORY (INHALATION) AS NEEDED
OUTPATIENT
Start: 2025-06-19

## 2025-06-05 RX ORDER — FAMOTIDINE 10 MG/ML
20 INJECTION, SOLUTION INTRAVENOUS ONCE AS NEEDED
Status: DISCONTINUED | OUTPATIENT
Start: 2025-06-05 | End: 2025-06-05 | Stop reason: HOSPADM

## 2025-06-05 RX ORDER — ALBUTEROL SULFATE 0.83 MG/ML
3 SOLUTION RESPIRATORY (INHALATION) AS NEEDED
Status: DISCONTINUED | OUTPATIENT
Start: 2025-06-05 | End: 2025-06-05 | Stop reason: HOSPADM

## 2025-06-05 RX ORDER — DIPHENHYDRAMINE HYDROCHLORIDE 50 MG/ML
50 INJECTION, SOLUTION INTRAMUSCULAR; INTRAVENOUS AS NEEDED
OUTPATIENT
Start: 2025-06-19

## 2025-06-05 RX ORDER — FAMOTIDINE 10 MG/ML
20 INJECTION, SOLUTION INTRAVENOUS ONCE AS NEEDED
OUTPATIENT
Start: 2025-06-19

## 2025-06-05 RX ORDER — EPINEPHRINE 0.3 MG/.3ML
0.3 INJECTION SUBCUTANEOUS EVERY 5 MIN PRN
Status: DISCONTINUED | OUTPATIENT
Start: 2025-06-05 | End: 2025-06-05 | Stop reason: HOSPADM

## 2025-06-05 RX ORDER — HEPARIN SODIUM,PORCINE/PF 10 UNIT/ML
50 SYRINGE (ML) INTRAVENOUS AS NEEDED
OUTPATIENT
Start: 2025-06-05

## 2025-06-05 RX ORDER — HEPARIN 100 UNIT/ML
500 SYRINGE INTRAVENOUS AS NEEDED
OUTPATIENT
Start: 2025-06-05

## 2025-06-05 ASSESSMENT — PAIN SCALES - GENERAL: PAINLEVEL_OUTOF10: 0-NO PAIN

## 2025-06-05 NOTE — PROGRESS NOTES
Tuan Wood phlebotomized for approximately 500 mL using a calibrated scale. Patient drank 24 oz of water and denies being lightheaded or dizzy. Procedure tolerated well, vital signs stable after phlebotomy. Gave and reviewed lab results and schedule with patient. Addressed questions and concerns. Patient left infusion independently in stable condition.

## 2025-06-07 LAB
ELECTRONICALLY SIGNED BY CYTOGENETICS: NORMAL
MICROARRAY PLATFORM: NORMAL

## 2025-06-07 PROCEDURE — 81229 CYTOG ALYS CHRML ABNR SNPCGH: CPT | Performed by: NURSE PRACTITIONER

## 2025-06-10 DIAGNOSIS — D45 JAK2 POSITIVE POLYCYTHEMIA VERA (MULTI): Primary | ICD-10-CM

## 2025-06-10 RX ORDER — ALBUTEROL SULFATE 0.83 MG/ML
3 SOLUTION RESPIRATORY (INHALATION) AS NEEDED
OUTPATIENT
Start: 2025-06-19

## 2025-06-10 RX ORDER — EPINEPHRINE 0.3 MG/.3ML
0.3 INJECTION SUBCUTANEOUS EVERY 5 MIN PRN
OUTPATIENT
Start: 2025-06-19

## 2025-06-10 RX ORDER — DIPHENHYDRAMINE HYDROCHLORIDE 50 MG/ML
50 INJECTION, SOLUTION INTRAMUSCULAR; INTRAVENOUS AS NEEDED
OUTPATIENT
Start: 2025-06-19

## 2025-06-10 RX ORDER — FAMOTIDINE 10 MG/ML
20 INJECTION, SOLUTION INTRAVENOUS ONCE AS NEEDED
OUTPATIENT
Start: 2025-06-19

## 2025-06-12 LAB
BASOPHILS # BLD AUTO: 170 CELLS/UL (ref 0–200)
BASOPHILS NFR BLD AUTO: 2 %
EOSINOPHIL # BLD AUTO: 374 CELLS/UL (ref 15–500)
EOSINOPHIL NFR BLD AUTO: 4.4 %
ERYTHROCYTE [DISTWIDTH] IN BLOOD BY AUTOMATED COUNT: 23.6 % (ref 11–15)
HCT VFR BLD AUTO: 59.5 % (ref 38.5–50)
HGB BLD-MCNC: 17 G/DL (ref 13.2–17.1)
LYMPHOCYTES # BLD AUTO: 1420 CELLS/UL (ref 850–3900)
LYMPHOCYTES NFR BLD AUTO: 16.7 %
MCH RBC QN AUTO: 20.7 PG (ref 27–33)
MCHC RBC AUTO-ENTMCNC: 28.6 G/DL (ref 32–36)
MCV RBC AUTO: 72.3 FL (ref 80–100)
MONOCYTES # BLD AUTO: 476 CELLS/UL (ref 200–950)
MONOCYTES NFR BLD AUTO: 5.6 %
NEUTROPHILS # BLD AUTO: 6061 CELLS/UL (ref 1500–7800)
NEUTROPHILS NFR BLD AUTO: 71.3 %
PLATELET # BLD AUTO: 514 THOUSAND/UL (ref 140–400)
PMV BLD REES-ECKER: 8.3 FL (ref 7.5–12.5)
RBC # BLD AUTO: 8.23 MILLION/UL (ref 4.2–5.8)
SERVICE CMNT-IMP: ABNORMAL
WBC # BLD AUTO: 8.5 THOUSAND/UL (ref 3.8–10.8)

## 2025-06-18 PROBLEM — R55 NEAR SYNCOPE: Status: RESOLVED | Noted: 2024-04-10 | Resolved: 2025-06-18

## 2025-06-19 ENCOUNTER — TELEPHONE (OUTPATIENT)
Dept: HEMATOLOGY/ONCOLOGY | Facility: CLINIC | Age: 70
End: 2025-06-19

## 2025-06-19 ENCOUNTER — INFUSION (OUTPATIENT)
Dept: HEMATOLOGY/ONCOLOGY | Facility: CLINIC | Age: 70
End: 2025-06-19
Payer: MEDICARE

## 2025-06-19 ENCOUNTER — OFFICE VISIT (OUTPATIENT)
Dept: PRIMARY CARE | Facility: CLINIC | Age: 70
End: 2025-06-19
Payer: MEDICARE

## 2025-06-19 VITALS
SYSTOLIC BLOOD PRESSURE: 126 MMHG | RESPIRATION RATE: 16 BRPM | OXYGEN SATURATION: 95 % | DIASTOLIC BLOOD PRESSURE: 81 MMHG | WEIGHT: 265.43 LBS | BODY MASS INDEX: 36.59 KG/M2 | HEART RATE: 81 BPM | TEMPERATURE: 97.5 F

## 2025-06-19 VITALS
WEIGHT: 269.8 LBS | TEMPERATURE: 98.4 F | SYSTOLIC BLOOD PRESSURE: 142 MMHG | OXYGEN SATURATION: 97 % | BODY MASS INDEX: 37.19 KG/M2 | HEART RATE: 100 BPM | DIASTOLIC BLOOD PRESSURE: 74 MMHG

## 2025-06-19 DIAGNOSIS — D45 JAK2 POSITIVE POLYCYTHEMIA VERA (MULTI): Primary | ICD-10-CM

## 2025-06-19 DIAGNOSIS — I25.10 ASHD (ARTERIOSCLEROTIC HEART DISEASE): ICD-10-CM

## 2025-06-19 DIAGNOSIS — D45 JAK2 POSITIVE POLYCYTHEMIA VERA (MULTI): ICD-10-CM

## 2025-06-19 DIAGNOSIS — I10 BENIGN HYPERTENSION: ICD-10-CM

## 2025-06-19 LAB
BASOPHILS # BLD AUTO: 0.13 X10*3/UL (ref 0–0.1)
BASOPHILS NFR BLD AUTO: 1.1 %
EOSINOPHIL # BLD AUTO: 0.5 X10*3/UL (ref 0–0.7)
EOSINOPHIL NFR BLD AUTO: 4.3 %
ERYTHROCYTE [DISTWIDTH] IN BLOOD BY AUTOMATED COUNT: 24.3 % (ref 11.5–14.5)
HCT VFR BLD AUTO: 54.9 % (ref 41–52)
HGB BLD-MCNC: 16.5 G/DL (ref 13.5–17.5)
IMM GRANULOCYTES # BLD AUTO: 0.03 X10*3/UL (ref 0–0.7)
IMM GRANULOCYTES NFR BLD AUTO: 0.3 % (ref 0–0.9)
LYMPHOCYTES # BLD AUTO: 1.46 X10*3/UL (ref 1.2–4.8)
LYMPHOCYTES NFR BLD AUTO: 12.6 %
MCH RBC QN AUTO: 21 PG (ref 26–34)
MCHC RBC AUTO-ENTMCNC: 30.1 G/DL (ref 32–36)
MCV RBC AUTO: 70 FL (ref 80–100)
MONOCYTES # BLD AUTO: 0.57 X10*3/UL (ref 0.1–1)
MONOCYTES NFR BLD AUTO: 4.9 %
NEUTROPHILS # BLD AUTO: 8.87 X10*3/UL (ref 1.2–7.7)
NEUTROPHILS NFR BLD AUTO: 76.8 %
PLATELET # BLD AUTO: 356 X10*3/UL (ref 150–450)
RBC # BLD AUTO: 7.86 X10*6/UL (ref 4.5–5.9)
WBC # BLD AUTO: 11.6 X10*3/UL (ref 4.4–11.3)

## 2025-06-19 PROCEDURE — 1125F AMNT PAIN NOTED PAIN PRSNT: CPT | Performed by: FAMILY MEDICINE

## 2025-06-19 PROCEDURE — 99214 OFFICE O/P EST MOD 30 MIN: CPT | Performed by: FAMILY MEDICINE

## 2025-06-19 PROCEDURE — G2211 COMPLEX E/M VISIT ADD ON: HCPCS | Performed by: FAMILY MEDICINE

## 2025-06-19 PROCEDURE — 1036F TOBACCO NON-USER: CPT | Performed by: FAMILY MEDICINE

## 2025-06-19 PROCEDURE — 3078F DIAST BP <80 MM HG: CPT | Performed by: FAMILY MEDICINE

## 2025-06-19 PROCEDURE — 3077F SYST BP >= 140 MM HG: CPT | Performed by: FAMILY MEDICINE

## 2025-06-19 PROCEDURE — 99195 PHLEBOTOMY: CPT

## 2025-06-19 PROCEDURE — 1159F MED LIST DOCD IN RCRD: CPT | Performed by: FAMILY MEDICINE

## 2025-06-19 PROCEDURE — 85025 COMPLETE CBC W/AUTO DIFF WBC: CPT

## 2025-06-19 RX ORDER — HEPARIN 100 UNIT/ML
500 SYRINGE INTRAVENOUS AS NEEDED
Status: DISCONTINUED | OUTPATIENT
Start: 2025-06-19 | End: 2025-06-19 | Stop reason: HOSPADM

## 2025-06-19 RX ORDER — FAMOTIDINE 10 MG/ML
20 INJECTION, SOLUTION INTRAVENOUS ONCE AS NEEDED
Status: DISCONTINUED | OUTPATIENT
Start: 2025-06-19 | End: 2025-06-19 | Stop reason: HOSPADM

## 2025-06-19 RX ORDER — FAMOTIDINE 10 MG/ML
20 INJECTION, SOLUTION INTRAVENOUS ONCE AS NEEDED
OUTPATIENT
Start: 2025-07-03

## 2025-06-19 RX ORDER — HEPARIN SODIUM,PORCINE/PF 10 UNIT/ML
50 SYRINGE (ML) INTRAVENOUS AS NEEDED
Status: DISCONTINUED | OUTPATIENT
Start: 2025-06-19 | End: 2025-06-19 | Stop reason: HOSPADM

## 2025-06-19 RX ORDER — EPINEPHRINE 0.3 MG/.3ML
0.3 INJECTION SUBCUTANEOUS EVERY 5 MIN PRN
Status: DISCONTINUED | OUTPATIENT
Start: 2025-06-19 | End: 2025-06-19 | Stop reason: HOSPADM

## 2025-06-19 RX ORDER — DIPHENHYDRAMINE HYDROCHLORIDE 50 MG/ML
50 INJECTION, SOLUTION INTRAMUSCULAR; INTRAVENOUS AS NEEDED
OUTPATIENT
Start: 2025-07-03

## 2025-06-19 RX ORDER — DIPHENHYDRAMINE HYDROCHLORIDE 50 MG/ML
50 INJECTION, SOLUTION INTRAMUSCULAR; INTRAVENOUS AS NEEDED
Status: DISCONTINUED | OUTPATIENT
Start: 2025-06-19 | End: 2025-06-19 | Stop reason: HOSPADM

## 2025-06-19 RX ORDER — EPINEPHRINE 0.3 MG/.3ML
0.3 INJECTION SUBCUTANEOUS EVERY 5 MIN PRN
OUTPATIENT
Start: 2025-07-03

## 2025-06-19 RX ORDER — HEPARIN 100 UNIT/ML
500 SYRINGE INTRAVENOUS AS NEEDED
OUTPATIENT
Start: 2025-06-19

## 2025-06-19 RX ORDER — ALBUTEROL SULFATE 0.83 MG/ML
3 SOLUTION RESPIRATORY (INHALATION) AS NEEDED
Status: DISCONTINUED | OUTPATIENT
Start: 2025-06-19 | End: 2025-06-19 | Stop reason: HOSPADM

## 2025-06-19 RX ORDER — HEPARIN SODIUM,PORCINE/PF 10 UNIT/ML
50 SYRINGE (ML) INTRAVENOUS AS NEEDED
OUTPATIENT
Start: 2025-06-19

## 2025-06-19 RX ORDER — ALBUTEROL SULFATE 0.83 MG/ML
3 SOLUTION RESPIRATORY (INHALATION) AS NEEDED
OUTPATIENT
Start: 2025-07-03

## 2025-06-19 ASSESSMENT — LIFESTYLE VARIABLES
SKIP TO QUESTIONS 9-10: 0
HOW OFTEN DO YOU HAVE SIX OR MORE DRINKS ON ONE OCCASION: LESS THAN MONTHLY
HOW OFTEN DO YOU HAVE A DRINK CONTAINING ALCOHOL: MONTHLY OR LESS
AUDIT-C TOTAL SCORE: 3
HOW MANY STANDARD DRINKS CONTAINING ALCOHOL DO YOU HAVE ON A TYPICAL DAY: 3 OR 4

## 2025-06-19 ASSESSMENT — ENCOUNTER SYMPTOMS
DEPRESSION: 0
OCCASIONAL FEELINGS OF UNSTEADINESS: 0
LOSS OF SENSATION IN FEET: 0

## 2025-06-19 ASSESSMENT — PATIENT HEALTH QUESTIONNAIRE - PHQ9
1. LITTLE INTEREST OR PLEASURE IN DOING THINGS: NOT AT ALL
2. FEELING DOWN, DEPRESSED OR HOPELESS: NOT AT ALL
SUM OF ALL RESPONSES TO PHQ9 QUESTIONS 1 & 2: 0

## 2025-06-19 ASSESSMENT — PAIN SCALES - GENERAL
PAINLEVEL_OUTOF10: 5
PAINLEVEL_OUTOF10: 5

## 2025-06-19 NOTE — TELEPHONE ENCOUNTER
Reason for Conversation  Medication Reaction    Background   Called and spoke to Toña. She states that numbness and tingling to her dad's hands started yesterday. Dr. Zuniga notified and states that this is not a common side effect of Hydrea but he can stop taking the medication if he wants to since he is having phlebotomy.  Toña states that he will stop take the medication. Follow up with Dr. Zuniga as ordered. Teach back provided.     Disposition   No disposition on file.

## 2025-06-19 NOTE — PATIENT INSTRUCTIONS
Here for follow up    For polycythemia vera - pt seeing oncology - side effects with hydroxyurea.  Agree with second opinion.  Continue with therapeutic phlebotomy.      For coronary artery disease - pt states could not tolerate cholesterol medication.  Pt is on amlodipine.  Coreg caused memory issues.  Seeing cardiology    For GERD - continue omeprazole.      For immunizations - you can get the following at the pharmacy:  Prevnar 20 (pneumonia vaccine).      Recheck in 6 months

## 2025-06-19 NOTE — PROGRESS NOTES
Patient came in today for phlebotomy [id number]. Today- Hgb 16.5 and Hct 54.9. Patient phlebotomized for approximately 500 mL using a calibrated scale. Patient drank 24oz of water and denies being lightheaded or dizzy. Procedure tolerated well, vital signs stable after phlebotomy. Gave and reviewed lab results and schedule with patient. Addressed questions and concerns. Patient left infusion independently in stable condition.

## 2025-06-19 NOTE — PROGRESS NOTES
Subjective   Patient ID: Tuan Wood is a 69 y.o. male who presents for 6 MO F/U.    Here for follow up.     Cancer History:  -Type: Polycythemia vera  -Stage: JAK2   -Oncologist: Dr. Zuniga  -F/U:  Patient had bone marrow biopsy - positive for JAK2 polycycthemia vera.  Pt tried hydroxyurea - unable to tolerate.  Patient will be going to get 2nd opinion from [facility].  Still pruritus.      Hypertension  -Patient is here for follow-up of elevated blood pressure.  On amlodipine - no issues.    -Blood pressure stable.    -Cardiac symptoms: none.   -Patient denies chest pain, dyspnea, and irregular heart beat.    -Cardiologist: Dr. Davalos             Review of Systems    Objective   Wt 122 kg (269 lb 12.8 oz)   BMI 37.19 kg/m²     Physical Exam  Vitals reviewed.   Constitutional:       General: He is not in acute distress.     Appearance: He is obese.   Cardiovascular:      Rate and Rhythm: Normal rate and regular rhythm.   Pulmonary:      Effort: Pulmonary effort is normal.      Breath sounds: No wheezing or rhonchi.   Musculoskeletal:      Right lower leg: No edema.      Left lower leg: No edema.   Lymphadenopathy:      Cervical: No cervical adenopathy.   Neurological:      Mental Status: He is alert.         Assessment/Plan   Diagnoses and all orders for this visit:  JAK2 positive polycythemia vera (Multi)  ASHD (arteriosclerotic heart disease)  Benign hypertension    Patient Instructions   Here for follow up    For polycythemia vera - pt seeing oncology - side effects with hydroxyurea.  Agree with second opinion.  Continue with therapeutic phlebotomy.      For coronary artery disease - pt states could not tolerate cholesterol medication.  Pt is on amlodipine.  Coreg caused memory issues.  Seeing cardiology    For GERD - continue omeprazole.      For immunizations - you can get the following at the pharmacy:  Prevnar 20 (pneumonia vaccine).      Recheck in 6 months

## 2025-06-24 ENCOUNTER — OFFICE VISIT (OUTPATIENT)
Dept: HEMATOLOGY/ONCOLOGY | Facility: CLINIC | Age: 70
End: 2025-06-24
Payer: MEDICARE

## 2025-06-24 VITALS
HEART RATE: 93 BPM | BODY MASS INDEX: 36.33 KG/M2 | DIASTOLIC BLOOD PRESSURE: 82 MMHG | TEMPERATURE: 98.2 F | RESPIRATION RATE: 18 BRPM | WEIGHT: 263.56 LBS | OXYGEN SATURATION: 96 % | SYSTOLIC BLOOD PRESSURE: 169 MMHG

## 2025-06-24 DIAGNOSIS — D45 JAK2 POSITIVE POLYCYTHEMIA VERA (MULTI): Primary | ICD-10-CM

## 2025-06-24 DIAGNOSIS — D75.1 POLYCYTHEMIA: ICD-10-CM

## 2025-06-24 PROCEDURE — 3077F SYST BP >= 140 MM HG: CPT | Performed by: STUDENT IN AN ORGANIZED HEALTH CARE EDUCATION/TRAINING PROGRAM

## 2025-06-24 PROCEDURE — 3079F DIAST BP 80-89 MM HG: CPT | Performed by: STUDENT IN AN ORGANIZED HEALTH CARE EDUCATION/TRAINING PROGRAM

## 2025-06-24 PROCEDURE — 1159F MED LIST DOCD IN RCRD: CPT | Performed by: STUDENT IN AN ORGANIZED HEALTH CARE EDUCATION/TRAINING PROGRAM

## 2025-06-24 PROCEDURE — 99214 OFFICE O/P EST MOD 30 MIN: CPT | Performed by: STUDENT IN AN ORGANIZED HEALTH CARE EDUCATION/TRAINING PROGRAM

## 2025-06-24 PROCEDURE — G2211 COMPLEX E/M VISIT ADD ON: HCPCS | Performed by: STUDENT IN AN ORGANIZED HEALTH CARE EDUCATION/TRAINING PROGRAM

## 2025-06-24 PROCEDURE — 1125F AMNT PAIN NOTED PAIN PRSNT: CPT | Performed by: STUDENT IN AN ORGANIZED HEALTH CARE EDUCATION/TRAINING PROGRAM

## 2025-06-24 ASSESSMENT — PAIN SCALES - GENERAL: PAINLEVEL_OUTOF10: 3

## 2025-06-24 NOTE — PROGRESS NOTES
Patient here for follow up visit with Dr. Zuniga for polycythemia, SOFIA 2 +.   Patient here with his daughter    Medications and Allergies reviewed and reconciled this visit.    Pt reports itching continues.   Phlebotomy 6/5/25 & 6/19/25.  Next phlebotomy 7/3/25 with labs to be done that day.     Pt reports appetite is fair .     MD recommends to restart Hydrea. Pt previously stopped due to numbness and tingling in hands.   Pt notified to report adverse effect.  Follow up per MD request in on month.    Pt. reports availability and use of mychart, Reviewed this is a good place to communicate with the team as well as review labs and upcoming orders.     No barriers to education noted, patient agrees to current plan and verbalized understanding using teach back method.

## 2025-06-24 NOTE — PROGRESS NOTES
Patient ID: Tuan Wood is a 69 y.o. male.  Referring Physician: Mary Zuniga MD  [address]  [facility] [address]  [place] [address]  Primary Care Provider: Pa Riley DO  Referral Reason: JAK2 V617F + PV    HPI:  The patient is 69 years old male with history of coronary artery disease status post multiple stents and status post open heart surgery, who was referred for JAK2 V6 1 7 F positive polycythemia.  Patient has completed bone marrow biopsy on May 19, 2025.  He has been found to have erythrocytosis hemoglobin running 17-18 with hematocrit 58 on recent labs also have thrombocytosis platelet 553 and mild leukocytosis WBC 11.3.  Also consistent with iron deficiency.  Patient complains of fatigue, night sweats, dizziness, poor concentration, flank pain, itchiness and red eyes.         FINAL DIAGNOSIS   A-C: BONE MARROW CLOT, CORE BIOPSY, ASPIRATE AND TOUCH IMPRINT, LEFT ILIAC CREST:      -- HYPERCELLULAR BONE MARROW (80% CELLULAR) WITH JAK2+ MYELOPROLIFERATIVE NEOPLASM  -- MATURING TRILINEAGE HEMATOPOIESIS           Past Medical History: Medical History[1]  Social History:   Social History     Socioeconomic History    Marital status:      Spouse name: Not on file    Number of children: Not on file    Years of education: Not on file    Highest education level: Not on file   Occupational History    Not on file   Tobacco Use    Smoking status: Former     Current packs/day: 0.00     Types: Cigarettes     Quit date:      Years since quittin.4    Smokeless tobacco: Never   Vaping Use    Vaping status: Never Used   Substance and Sexual Activity    Alcohol use: Yes     Comment: OCCASIONALLY    Drug use: Never    Sexual activity: Not on file   Other Topics Concern    Not on file   Social History Narrative    Not on file     Social Drivers of Health     Financial Resource Strain: Not on file   Food Insecurity: Not on file   Transportation Needs: Not on file   Physical Activity: Not on file   Stress: Not on  file   Social Connections: Not on file   Intimate Partner Violence: Not on file   Housing Stability: Not on file     Surgical History: Surgical History[2]  Family History: Family History[3]   reports that he quit smoking about 26 years ago. His smoking use included cigarettes. He has never used smokeless tobacco.  Oncology Family history: Cancer-related family history includes Breast cancer in his sister; Cancer in his father, mother, paternal grandfather, sibling, sister, and sister; Colon cancer in his mother and sister; Lung cancer in his father; Stomach cancer in his sibling; Uterine cancer in his sister.    Review Of Systems:  General: no fatigue, weight change, appetite change  Ears/Nose/Mouth/Throat: no mouth sore, no hearing loss, no nasal congestion, no sore throat  Cardiovascular: no chest pain, no shortness of breath, no palpitation  Pulmonary: no cough, no wheezing, no hemoptysis  GI: no nausea, no vomiting, no diarrhea or constipation, no bleeding per rectum  Neurological: no dizziness, no seizure, no weakness, no sensation change  Musculoskeletal: no joint swelling, no bone pain, no back pain  Skin: no skin rash, no bruising, no skin lesion      Physical Exam:  /82 (BP Location: Right arm, Patient Position: Sitting, BP Cuff Size: Adult long)   Pulse 93   Temp 36.8 °C (98.2 °F) (Temporal)   Resp 18   Wt 120 kg (263 lb 9 oz)   SpO2 96%   BMI 36.33 kg/m²   BSA: 2.46 meters squared  General: awake/alert/oriented x3, no distress  Head: atraumatic. Symmetric facial expressions  Eyes: PERRL, EOMI, clear sclera.  Ears/Nose/Mouth/Throat:  Oral mucous membranes moist. No oral ulcers  Neck: No palpable cervical chain lymph nodes  Respiratory: unlabored breathing on room air, good chest expansion, clear breath sounds on both sides, no ronchi  Cardio: Regular rate and rhythm, normal S1 and S2, radial pulses symmetric  GI: Nondistended, soft, non-tender abdomen, palpable splenomegaly 3 cm below costal  margin  Musculoskeletal: Normal muscle bulk and tone, ROM intact, no joint swelling.  Extremities: No pedal edema, no arm or leg wounds  Neuro: Alert, cognition intact, speech normal. No motor deficits noted. Sensation intact to touch and hot/cold.   Psychological: Appropriate mood and behavior.  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  Recommend to take low-dose aspirin.  And phlebotomy every 2 weeks for hematocrit control  less than 45%.  given high risks patient will need cytoreduction.  First-line treatment options discussed Hydrea versus ropeginterferon.  Due to patient have current mood disorder, recommend hydroxyurea. started with low-dose Hydrea 500 mg daily, after 3 weeks patient developed peripheral neuropathy and stopped taking. It is rare side effect, and recommend to give a try to hydrea again. If not tolerated, will start him on ruxolitinib  - s/p phlebotomy every 2 week, hgb 16.5 amd hematocrit 54%, continue as planned with  target Hematocrit <45%  - return to Clinic in 4 weeks    Performance Status: Symptomatic; fully ambulatory    I spent more than 30 minutes for the patient today, including face-to-face conversation, pre-visit preparation, post-visit orders, and others.   Mary Zuniga MD                                [1]   Past Medical History:  Diagnosis Date    Arthritis     ASHD (arteriosclerotic heart disease)     TEMPLETON (dyspnea on exertion)     Sena Saunders infection     GERD (gastroesophageal reflux disease)     HLD (hyperlipidemia)     HTN (hypertension)     MI (myocardial infarction) (Multi)     PER DAUGHTER- PT MAY HAVE HAD 5    Old myocardial infarction     History of myocardial infarction    Personal history of other diseases of the circulatory system     History of hypertension    Personal history of pulmonary embolism     History of pulmonary embolism, approx 20 yrs    Wears dentures    [2]   Past Surgical History:  Procedure Laterality Date    APPENDECTOMY      at 21 years old    CARDIAC CATHETERIZATION      CORONARY ARTERY BYPASS GRAFT      CABGX1 2004    CORONARY STENT PLACEMENT  2019    X1    OTHER SURGICAL HISTORY      Heart surgery    OTHER SURGICAL HISTORY      UMBILICAL HERNIA REPAIR    OTHER SURGICAL HISTORY      Coronary artery bypass graft    SPINAL FUSION      AND DECOMPRESSION   [3]   Family History  Problem Relation Name Age of Onset    Cancer Mother      Colon cancer Mother      Cancer Father      Lung cancer Father      Lung disease Father      Breast cancer Sister      Cancer Sister      Colon cancer Sister      Cancer Sister      Uterine cancer Sister      Cancer Paternal Grandfather      Heart disease Paternal Grandfather      Other (colorectal cancer) Paternal Grandfather      Cancer Sibling      Stomach cancer Sibling     [4]   Current Outpatient Medications:     amLODIPine (Norvasc) 10 mg tablet, Take 1 tablet (10 mg) by mouth once daily., Disp: 90 tablet, Rfl: 3    aspirin 81 mg EC tablet, Take 1  tablet (81 mg) by mouth once daily., Disp: , Rfl:     hydroxyurea (Hydrea) 500 mg capsule, Take 1 capsule (500 mg total) by mouth once daily.  Take at the same time each day., Disp: 90 capsule, Rfl: 0    nitroglycerin (Nitrostat) 0.4 mg SL tablet, 1 tablet (0.4 mg).  every 5 min as needed may repeat up to 3x before seeking medical attention Sublingual as directed, Disp: , Rfl:     omeprazole (PriLOSEC) 20 mg DR capsule, TAKE 1 CAPSULE BY MOUTH ONCE DAILY.  DO NOT CRUSH OR CHEW., Disp: 90 capsule, Rfl: 3    prasugrel (Effient) 10 mg tablet, Take 1 tablet by mouth once daily, Disp: 90 tablet, Rfl: 3    hydrOXYzine pamoate (Vistaril) 100 mg capsule, Take 1 capsule (100 mg) by mouth 3 times a day as needed for itching., Disp: 30 capsule, Rfl: 3

## 2025-07-03 ENCOUNTER — APPOINTMENT (OUTPATIENT)
Dept: HEMATOLOGY/ONCOLOGY | Facility: CLINIC | Age: 70
End: 2025-07-03
Payer: MEDICARE

## 2025-07-17 ENCOUNTER — APPOINTMENT (OUTPATIENT)
Dept: HEMATOLOGY/ONCOLOGY | Facility: CLINIC | Age: 70
End: 2025-07-17
Payer: MEDICARE

## 2025-07-17 LAB
BAND RESOLUTION: 400 BANDS
CHROM ANALY OVERALL INTERP-IMP: NORMAL
CHROMOSOME ANALYSIS CELLS ANALYZED: 20 CELLS
CHROMOSOME ANALYSIS CELLS IMAGED: 4 CELLS
CHROMOSOME ANALYSIS HYPERMODAL CELL COUNT: 0 CELLS
CHROMOSOME ANALYSIS HYPOMODAL CELL COUNT: 0 CELLS
CHROMOSOME ANALYSIS MODAL CHROMOSOME NO: 46 CHROMOSOMES
CHROMOSOME ANALYSIS STAINING METHOD: NORMAL
ELECTRONICALLY SIGNED BY CYTOGENETICS: NORMAL
KARYOTYP MAR: 2 CELLS
TOTAL CELLS COUNTED MAR: 20 CELLS

## 2025-07-31 ENCOUNTER — APPOINTMENT (OUTPATIENT)
Dept: HEMATOLOGY/ONCOLOGY | Facility: CLINIC | Age: 70
End: 2025-07-31
Payer: MEDICARE

## 2025-08-05 ENCOUNTER — APPOINTMENT (OUTPATIENT)
Dept: HEMATOLOGY/ONCOLOGY | Facility: CLINIC | Age: 70
End: 2025-08-05
Payer: MEDICARE